# Patient Record
Sex: FEMALE | Race: WHITE | Employment: OTHER | URBAN - METROPOLITAN AREA
[De-identification: names, ages, dates, MRNs, and addresses within clinical notes are randomized per-mention and may not be internally consistent; named-entity substitution may affect disease eponyms.]

---

## 2017-07-12 ENCOUNTER — HOSPITAL ENCOUNTER (OUTPATIENT)
Dept: PHYSICAL THERAPY | Age: 62
Discharge: HOME | End: 2017-07-12
Payer: OTHER GOVERNMENT

## 2017-07-12 PROCEDURE — 97161 PT EVAL LOW COMPLEX 20 MIN: CPT

## 2017-07-12 PROCEDURE — 97530 THERAPEUTIC ACTIVITIES: CPT

## 2017-07-12 NOTE — PROGRESS NOTES
[facility] at [facility], [address] [place] [address]  Phone: [phone]   Fax: [phone]    Plan of Care/ Statement of Necessity for Physical Therapy Services     Patient name: Rani Cantrell Start of Care: 2017   Referral source: Wes Mccarty MD : 1955    Medical Diagnosis: Knee pain, right [M25.561]   Onset Date:2017    Treatment Diagnosis: david knee pain   Prior Hospitalization: see medical history Provider#: [id number]   Medications: Verified on Patient summary List    Comorbidities: DM, HTN, BMI   Prior Level of Function: walking 1 mile, no deficits with standing or walking prior to pain in 2017    The Plan of Care and following information is based on the information from the initial evaluation. Assessment/ key information: Pt is a 60 y.o. female presenting to therapy with david knee pain and diagnosis of david osteoarthritis per MD script. Pt reports pain began with insidious onset 2017 and gradual worsening of right knee pain leading to left knee pain 3 weeks ago. XRAY confirm right knee severe OA. Pt impairments include limited right knee ROM, decreased hip and glut strength, decrease quad contraction david, tenderness to palpation around joint line and pes anserine's. Pt functional limitations include pain/difficulty with getting into/out of tub, getting up and down from floor, picking up kids, standing, walking, sitting prolonged periods of time, step to pattern . Pt treatment diagnosis consistent with david knee OA. Pt would benefit from skilled PT to improve impairments listed above and return pt to prior level of function of less pain with work and ADLs.    Evaluation Complexity History MEDIUM  Complexity : 1-2 comorbidities / personal factors will impact the outcome/ POC ; Examination HIGH Complexity : 4+ Standardized tests and measures addressing body structure, function, activity limitation and / or participation in recreation ;Presentation LOW Complexity : Stable, uncomplicated  ;Clinical Decision Making MEDIUM Complexity : FOTO score of 26-74  Overall Complexity Rating: LOW   Problem List: pain affecting function, decrease ROM, decrease strength, impaired gait/ balance, decrease ADL/ functional abilitiies, decrease activity tolerance, decrease flexibility/ joint mobility and decrease transfer abilities   Treatment Plan may include any combination of the following: Therapeutic exercise, Therapeutic activities, Neuromuscular re-education, Physical agent/modality, Gait/balance training, Manual therapy, Aquatic therapy, Patient education, Self Care training and Functional mobility training  Patient / Family readiness to learn indicated by: asking questions, trying to perform skills and interest  Persons(s) to be included in education: patient (P)  Barriers to Learning/Limitations: None  Patient Goal (s): I want to be able to go back to my walking especially, and to be able to get on the floor and get back up ( duties). \"  Patient Self Reported Health Status: good  Rehabilitation Potential: good    Short Term Goals: STG- To be accomplished in 2 week(s):  1. Pt will be independent with HEP to encourage prophylaxis. Eval:dispensed  Current: NA     Long Term Goals: LTG- To be accomplished in 6 week(s):  1. Pt will report pain at worst does not exceed 4/10 to allow pt to return to walking >0.5 mi for fitness and health related goals. Eval:10/10  Current: NA     2.  Pt will report min to no TTP at pes anserine's [facility] to normalize ADLs and improve quality of life. Eval:mod TTP  Current: NA     3.  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 Eval:  Strength (1-5)        Left Right   Hip Flexion 4 4- pain      Extension 4- 4-      Abduction 4- 3+      Adduction 4- 4   Current: NA     4.   Pt FOTO score will increase by >10 points to show improvement in david knee pain function. Eval:37  Current: will address at visit 5       Frequency / Duration: Patient to be seen 2 times per week for 6 weeks. Patient/ Caregiver education and instruction: Diagnosis, prognosis, self care, activity modification and exercises   [x]  Plan of care has been reviewed with TRELL Neves, PT 7/12/2017 3:29 PM  _____________________________________________________________________  I certify that the above Therapy Services are being furnished while the patient is under my care. I agree with the treatment plan and certify that this therapy is necessary.     Physician's Signature:____________________  Date:__________Time:______    Please sign and return to [facility] at [address] [place] [address]  Phone: [phone]   Fax: [phone]

## 2017-07-12 NOTE — PROGRESS NOTES
PT DAILY TREATMENT NOTE/KNEE EVAL 3-16    Patient Name: Greg Solis  Date:2017  : 1955  [x]  Patient  Verified  Payor:  / Plan: [facility]  RETIREES AND DEPENDENTS / Product Type: Christianne Leventhal /    In time:314  Out time:345  Total Treatment Time (min): 31  Visit #: 1 of 12    Treatment Area: Knee pain, right [M25.561]    SUBJECTIVE  Pain Level (0-10 scale): 8  []constant []intermittent []improving []worsening []no change since onset    Any medication changes, allergies to medications, adverse drug reactions, diagnosis change, or new procedure performed?: [x] No    [] Yes (see summary sheet for update)  Subjective functional status/changes:       Pain:  _10_/10 max       _4_/10 min     __8_/10 now    Location: right inferior patellar and anterior, medial compartment of knee and anterior shin on occasion     [X] Sharp    [ ] Ltanya Push [ ] Diania Deepali [ Veatrice Reasons [ ] Bk[employer]Christianity [ ] Thomas Spearing [ ] Other:        [X]  [de-identified] [ ] Intermittent        Weakness/Popping/clicking/giving way- 2 falls in last 6 mo due to right LE giving way  Pain at night- waking several nights due to pain    Social/Recreation       [address], gardening, walking  (1 mile- prior to onset of pain)               Aggravating factors- getting into/out of tub, getting up and down from floor, picking up kids, standing, walking, sitting prolonged periods of time, step to pattern   Relieving factors    PLOF: walking 1 mile, no deficits with standing or walking prior to pain in 2017  Limitations to PLOF: pain  Mechanism of Injury: Pt reports 2017 right knee pain began to ache and then gradual worsening of pain with clicking and giving way. Pt went to MD and increased medication with little improvement. Pt had XRAY with reports of severe OA, trial PT and if no success perhaps discuss TKA.   Pt reports left knee pain began to worsen 3 weeks ago, but reports not anything like right LE and has \"always had left LE pain\" (fell and broke left foot). Pt reports PT has been successfully in left LE pain in the past.  Current symptoms/Complaints: pain  Previous Treatment/Compliance: PT with improvements for left LE  PMHx/Surgical Hx: denies  Work Hx: day care for   Living Situation: lives with spouse, bedroom on first floor, shower on second floor- step to pattern  Pt Goals: \"I want to be able to go back to my walking especially, and to be able to get on the floor and get back up ( duties). \"  Barriers: []pain []financial []time []transportation []other  Motivation: good  Substance use: []Alcohol []Tobacco []other:   FABQ Score: []low []elevate  Cognition: A & O x 4    Other:    OBJECTIVE/EXAMINATION    21 min []Eval                  []Re-Eval       10 min Therapeutic Activity:  []  See flow sheet : Discussed and reviewed diagnosis, prognosis, POC, HEP   Rationale: increase ROM, increase strength, improve coordination, improve balance and increase proprioception  to improve the patients ability to perform ADL's with reduced pain levels.          With   [] TE   [] TA   [] neuro   [] other: Patient Education: [x] Review HEP    [] Progressed/Changed HEP based on:   [] positioning   [] body mechanics   [] transfers   [] heat/ice application    [] other:      Physical Therapy Evaluation - Knee    Posture: [] Varus    [] Valgus    [] Recurvatum        [] Tibial Torsion    [] Foot Supination    [] Foot Pronation    Describe:    Gait:  [] Normal    [] Abnormal    [] Antalgic    [] NWB    Device:    Describe:    ROM / Strength  [] Unable to assess                  AROM                      PROM                   Strength (1-5)    Left Right Left Right Left Right   Hip Flexion     4 4- pain    Extension     4- 4-    Abduction     4- 3+    Adduction     4- 4   Knee Flexion 135 116   5 4    Extension Hyper 2 0   5 4   Ankle Plantarflexion     5 5    Dorsiflexion     5 5       Flexibility: [] Unable to assess at this time  Hamstrings:    (L) Tightness= [] WNL   [x] Min   [] Mod   [] Severe    (R) Tightness= [] WNL   [x] Min   [] Mod   [] Severe  Quadriceps:    (L) Tightness= [] WNL   [] Min   [] Mod   [] Severe    (R) Tightness= [] WNL   [] Min   [] Mod   [] Severe  Gastroc:      (L) Tightness= [] WNL   [] Min   [] Mod   [] Severe    (R) Tightness= [] WNL   [] Min   [] Mod   [] Severe  Other:    Palpation:   Neg/Pos  Neg/Pos  Neg/Pos   Joint Line pos Quad tendon  Patellar ligament    Patella  Fibular head  Pes Anserinus Pos right and left   Tibial tubercle  Hamstring tendons Pos- right Infrapatellar fat pad      Optional Tests:  Patellar Positioning (Static)   []L []R Normal []L []R Lateral   []L []R Edyth Model      []L []R Medial   []L []R Baja    Patellar Tracking   []L []R Glide (Lat)   []L []R Tilt (Lat)     []L []R Glide (Med)  []L []R Tilt (Med)      []L []R Tile (Inf)     Patellar Mobility   []L []R Hypermobile []L []R Hypomobile         Girth Measurements:     Cm at  Cm above joint line   Cm at   Cm below joint line  Cm at joint line   Left        Right           Lachmans  [] Neg    [] Pos Posterior Drawer [] Neg    [] Pos  Pivot Shift  [] Neg    [] Pos Posterior Sag  [] Neg    [] Pos  KIRSTEN   [] Neg    [] Pos Brennan's Test [] Neg    [] Pos  ALRI   [] Neg    [] Pos Squat   [] Neg    [] Pos  Valgus@ 0 Degrees [] Neg    [] Pos Keyla-Ian [] Neg    [] Pos  Valgus@ 30 Degrees [] Neg    [] Pos Patellar Apprehension [] Neg    [x] Pos  Varus@ 0 Degrees [] Neg    [] Pos Benjamin's Compression [] Neg    [] Pos  Varus@ 30 Degrees [] Neg    [] Pos Ely's Test  [] Neg    [] Pos  Apley's Compression [] Neg    [] Pos Clayton's Test  [] Neg    [] Pos  Apley's Distraction [] Neg    [] Pos Stroke Test  [] Neg    [] Pos   Anterior Drawer [] Neg    [] Pos Fluctuation Test [] Neg    [] Pos  Other:                  [] Neg    [] Pos                 Other tests/comments: most tender at joint line and pes anserine's   Fair quad set david  Minor limitations in patellar mobility in right LE       Pain Level (0-10 scale) post treatment: 6    ASSESSMENT/Changes in Function: Pt is a 60 y.o. female presenting to therapy with david knee pain and diagnosis of david osteoarthritis per MD script. Pt reports pain began with insidious onset 05/2017 and gradual worsening of right knee pain leading to left knee pain 3 weeks ago. XRAY confirm right knee severe OA. Pt impairments include limited right knee ROM, decreased hip and glut strength, decrease quad contraction david, tenderness to palpation around joint line and pes anserine's. Pt functional limitations include pain/difficulty with getting into/out of tub, getting up and down from floor, picking up kids, standing, walking, sitting prolonged periods of time, step to pattern . Pt treatment diagnosis consistent with david knee OA. Pt would benefit from skilled PT to improve impairments listed above and return pt to prior level of function of less pain with work and ADLs. Patient will continue to benefit from skilled PT services to modify and progress therapeutic interventions, address functional mobility deficits, address ROM deficits, address strength deficits, analyze and address soft tissue restrictions, analyze and cue movement patterns, analyze and modify body mechanics/ergonomics, assess and modify postural abnormalities and instruct in home and community integration to attain remaining goals. []  See Plan of Care  []  See progress note/recertification  []  See Discharge Summary         Progress towards goals / Updated goals:  Short Term Goals: STG- To be accomplished in 2 week(s):  1. Pt will be independent with HEP to encourage prophylaxis. Eval:dispensed  Current: NA    Long Term Goals: LTG- To be accomplished in 6 week(s):  1. Pt will report pain at worst does not exceed 4/10 to allow pt to return to walking >0.5 mi for fitness and health related goals. Eval:10/10  Current: NA    2.   Pt will report min to no TTP at pes anserine's [facility] to normalize ADLs and improve quality of life. Eval:mod TTP  Current: NA    3. 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 Eval:  Strength (1-5)      Left Right   Hip Flexion 4 4- pain     Extension 4- 4-     Abduction 4- 3+     Adduction 4- 4   Current: NA    4. Pt FOTO score will increase by >10 points to show improvement in david knee pain function.   Eval:37  Current: will address at visit 5    PLAN  [x]  Upgrade activities as tolerated     []  Continue plan of care  []  Update interventions per flow sheet       []  Discharge due to:_  []  Other:_      [place], PT 7/12/2017  3:16 PM

## 2017-07-14 ENCOUNTER — HOSPITAL ENCOUNTER (OUTPATIENT)
Dept: PHYSICAL THERAPY | Age: 62
Discharge: HOME | End: 2017-07-14
Payer: OTHER GOVERNMENT

## 2017-07-14 PROCEDURE — 97110 THERAPEUTIC EXERCISES: CPT

## 2017-07-14 PROCEDURE — 97112 NEUROMUSCULAR REEDUCATION: CPT

## 2017-07-14 PROCEDURE — 97016 VASOPNEUMATIC DEVICE THERAPY: CPT

## 2017-07-14 NOTE — PROGRESS NOTES
PT DAILY TREATMENT NOTE     Patient Name: Alber Nichols  Date:2017  : 1955  [x]  Patient  Verified  Payor:  / Plan: [facility]  RETIREES AND DEPENDENTS / Product Type: Clemon Hope /    In time:1107  Out time:1150  Total Treatment Time (min): 43  Visit #: 2 of 12    Treatment Area: Knee pain, right [M25.561]    SUBJECTIVE  Pain Level (0-10 scale): 8  Any medication changes, allergies to medications, adverse drug reactions, diagnosis change, or new procedure performed?: [x] No    [] Yes (see summary sheet for update)  Subjective functional status/changes:   [] No changes reported  \"Those exercises you gave me were hard. \"    OBJECTIVE    Modality rationale: decrease inflammation and decrease pain to improve the patients ability to perform ADL's with reduced pain levels.     Min Type Additional Details    [] Estim:  []Unatt       []IFC  []Premod                        []Other:  []w/ice   []w/heat  Position:  Location:    [] Estim: []Att    []TENS instruct  []NMES                    []Other:  []w/US   []w/ice   []w/heat  Position:  Location:    []  Traction: [] Cervical       []Lumbar                       [] Prone          []Supine                       []Intermittent   []Continuous Lbs:  [] before manual  [] after manual    []  Ultrasound: []Continuous   [] Pulsed                           []1MHz   []3MHz W/cm2:  Location:    []  Iontophoresis with dexamethasone         Location: [] Take home patch   [] In clinic    []  Ice     []  heat  []  Ice massage  []  Laser   []  Anodyne Position:  Location:    []  Laser with stim  []  Other:  Position:  Location:   10 [x]  Vasopneumatic Device Pressure:       [] lo [] med [] hi   Temperature: [] lo [] med [] hi   [x] Skin assessment post-treatment:  [x]intact []redness- no adverse reaction    []redness - adverse reaction:       22 min Therapeutic Exercise:  [x] See flow sheet :   Rationale: increase ROM, increase strength, improve coordination, improve balance and increase proprioception to improve the patients ability to perform ADL's with reduced pain levels. 11 min Neuromuscular Re-education:  [x]  See flow sheet :   Rationale: increase ROM, increase strength, improve coordination, improve balance and increase proprioception  to improve the patients ability to perform ADL's with reduced pain levels. With   [] TE   [] TA   [] neuro   [] other: Patient Education: [x] Review HEP    [] Progressed/Changed HEP based on:   [] positioning   [] body mechanics   [] transfers   [] heat/ice application    [] other:      Other Objective/Functional Measures:  3-5 errors with SLS    Pain Level (0-10 scale) post treatment: 5    ASSESSMENT/Changes in Function: Pt challenged with narrow base of support (NBOS) on foam balance    Patient will continue to benefit from skilled PT services to modify and progress therapeutic interventions, address functional mobility deficits, address ROM deficits, address strength deficits, analyze and address soft tissue restrictions, analyze and cue movement patterns, analyze and modify body mechanics/ergonomics, assess and modify postural abnormalities and instruct in home and community integration to attain remaining goals. []  See Plan of Care  []  See progress note/recertification  []  See Discharge Summary         Progress towards goals / Updated goals:  Short Term Goals: STG- To be accomplished in 2 week(s):  1.  Pt will be independent with HEP to encourage prophylaxis. Eval:dispensed  Current: reports compliance      Long Term Goals: LTG- To be accomplished in 6 week(s):  1.  Pt will report pain at worst does not exceed 4/10 to allow pt to return to walking >0.5 mi for fitness and health related goals. Eval:10/10  Current: NA      2.  Pt will report min to no TTP at [facility]'s [facility] to normalize ADLs and improve quality of life.   Eval:mod TTP  Current: NA      3.  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 Eval:  Strength (1-5)        Left Right   Hip Flexion 4 4- pain      Extension 4- 4-      Abduction 4- 3+      Adduction 4- 4   Current: NA      4.  Pt FOTO score will increase by >10 points to show improvement in david knee pain function.   Eval:37  Current: will address at visit 5      PLAN  [x]  Upgrade activities as tolerated     []  Continue plan of care  []  Update interventions per flow sheet       []  Discharge due to:_  []  Other:_      Lexi Chin PT 7/14/2017  11:12 AM    Future Appointments  Date Time Provider Yanira Raygoza   7/24/2017 11:00 AM Lexi Chin PT [facility]   7/26/2017 11:00 AM Lanette Yuen PT [facility]   7/31/2017 11:00 AM Lanette Yuen PT [facility]   8/2/2017 11:00 AM Lexi Chin PT [facility]HPTRO[facility]   8/7/2017 11:30 AM Lexi Chin PT [facility]PGISSELLE [facility]   8/9/2017 11:30 AM Lexi Chin PT JAETRO[facility]   3/22/2018 10:15 AM Adrien Banuelos MD [address] [place] [address]

## 2017-07-21 ENCOUNTER — HOSPITAL ENCOUNTER (OUTPATIENT)
Dept: PHYSICAL THERAPY | Age: 62
Discharge: HOME | End: 2017-07-21
Payer: OTHER GOVERNMENT

## 2017-07-21 PROCEDURE — 97110 THERAPEUTIC EXERCISES: CPT

## 2017-07-21 PROCEDURE — 97112 NEUROMUSCULAR REEDUCATION: CPT

## 2017-07-21 PROCEDURE — 97016 VASOPNEUMATIC DEVICE THERAPY: CPT

## 2017-07-21 NOTE — PROGRESS NOTES
PT DAILY TREATMENT NOTE 12    Patient Name: Nathaly Ayoub  Date:2017  : 1955  [x]  Patient  Verified  Payor:  / Plan: [facility]  RETIREES AND DEPENDENTS / Product Type: Lavada Gave /    In time:900  Out time:947  Total Treatment Time (min): 52  Visit #: 3 of 12    Treatment Area: Knee pain, right [M25.561]    SUBJECTIVE  Pain Level (0-10 scale): 8  Any medication changes, allergies to medications, adverse drug reactions, diagnosis change, or new procedure performed?: [x] No    [] Yes (see summary sheet for update)  Subjective functional status/changes:   [] No changes reported  \"My knees hurt. \"    OBJECTIVE    Modality rationale: decrease inflammation and decrease pain to improve the patients ability to perform ADL's with reduced pain levels.     Min Type Additional Details    [] Estim:  []Unatt       []IFC  []Premod                        []Other:  []w/ice   []w/heat  Position:  Location:    [] Estim: []Att    []TENS instruct  []NMES                    []Other:  []w/US   []w/ice   []w/heat  Position:  Location:    []  Traction: [] Cervical       []Lumbar                       [] Prone          []Supine                       []Intermittent   []Continuous Lbs:  [] before manual  [] after manual    []  Ultrasound: []Continuous   [] Pulsed                           []1MHz   []3MHz W/cm2:  Location:    []  Iontophoresis with dexamethasone         Location: [] Take home patch   [] In clinic    []  Ice     []  heat  []  Ice massage  []  Laser   []  Anodyne Position:  Location:    []  Laser with stim  []  Other:  Position:  Location:   10 [x]  Vasopneumatic Device Pressure:       [] lo [] med [] hi   Temperature: [] lo [] med [] hi   [x] Skin assessment post-treatment:  [x]intact []redness- no adverse reaction    []redness - adverse reaction:       24 min Therapeutic Exercise:  [x] See flow sheet :   Rationale: increase ROM, increase strength, improve coordination, improve balance and increase proprioception to improve the patients ability to perform ADL's with reduced pain levels.         10 min Neuromuscular Re-education:  [x]  See flow sheet :   Rationale: increase ROM, increase strength, improve coordination, improve balance and increase proprioception  to improve the patients ability to perform ADL's with reduced pain levels. With   [] TE   [] TA   [] neuro   [] other: Patient Education: [x] Review HEP    [] Progressed/Changed HEP based on:   [] positioning   [] body mechanics   [] transfers   [] heat/ice application    [] other:      Other Objective/Functional Measures: challenged with balance     Pain Level (0-10 scale) post treatment: 0    ASSESSMENT/Changes in Function: Pt reported MD visit this week, to be referred to ortho. Pt reported appropriate fatigue with therEx. Patient will continue to benefit from skilled PT services to modify and progress therapeutic interventions, address functional mobility deficits, address ROM deficits, address strength deficits, analyze and address soft tissue restrictions, analyze and cue movement patterns, analyze and modify body mechanics/ergonomics, assess and modify postural abnormalities and instruct in home and community integration to attain remaining goals. []  See Plan of Care  []  See progress note/recertification  []  See Discharge Summary         Progress towards goals / Updated goals:  Short Term Goals: STG- To be accomplished in 2 week(s):  1.  Pt will be independent with HEP to encourage prophylaxis. Eval:dispensed  Current: reports compliance      Long Term Goals: LTG- To be accomplished in 6 week(s):  1.  Pt will report pain at worst does not exceed 4/10 to allow pt to return to walking >0.5 mi for fitness and health related goals. Eval:10/10  Current: NA      2.  Pt will report min to no TTP at [facility] anserine's [facility] to normalize ADLs and improve quality of life.   Eval:mod TTP  Current: NA      3.  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 Eval:  Strength (1-5)        Left Right   Hip Flexion 4 4- pain      Extension 4- 4-      Abduction 4- 3+      Adduction 4- 4   Current: NA      4.  Pt FOTO score will increase by >10 points to show improvement in david knee pain function.   Eval:37  Current: will address at visit 5      PLAN  [x]  Upgrade activities as tolerated     []  Continue plan of care  []  Update interventions per flow sheet       []  Discharge due to:_  []  Other:_      Raúl Winter PT 7/21/2017  9:24 AM    Future Appointments  Date Time Provider Yanira Raygoza   7/24/2017 11:00 AM [id number] [facility]   7/26/2017 11:00 AM VIVIENNE Gonzalez [facility]   7/31/2017 11:00 AM VIVIENNE Gonzalez [facility]   8/2/2017 11:00 AM VIVIENNE Delaney [facility]   8/7/2017 11:30 AM VIVIENNE Delaney [facility]   8/9/2017 11:30 AM VIVIENNE Delaney [facility]   3/22/2018 10:15 AM [facility]

## 2017-07-24 ENCOUNTER — APPOINTMENT (OUTPATIENT)
Dept: PHYSICAL THERAPY | Age: 62
End: 2017-07-24
Payer: OTHER GOVERNMENT

## 2017-07-31 ENCOUNTER — HOSPITAL ENCOUNTER (OUTPATIENT)
Dept: PHYSICAL THERAPY | Age: 62
Discharge: HOME | End: 2017-07-31
Payer: OTHER GOVERNMENT

## 2017-07-31 PROCEDURE — 97112 NEUROMUSCULAR REEDUCATION: CPT

## 2017-07-31 PROCEDURE — 97016 VASOPNEUMATIC DEVICE THERAPY: CPT

## 2017-07-31 PROCEDURE — 97110 THERAPEUTIC EXERCISES: CPT

## 2017-07-31 NOTE — PROGRESS NOTES
PT DAILY TREATMENT NOTE     Patient Name: Tessa Hernandez  Date:2017  : 1955  [x]  Patient  Verified  Payor:  / Plan: [facility]  RETIREES AND DEPENDENTS / Product Type: Ashley [place] /    In time:1100  Out time:1150  Total Treatment Time (min): 50  Visit #: 4 of 12    Treatment Area: Knee pain, right [M25.561]    SUBJECTIVE  Pain Level (0-10 scale): 4  Any medication changes, allergies to medications, adverse drug reactions, diagnosis change, or new procedure performed?: [x] No    [] Yes (see summary sheet for update)  Subjective functional status/changes:   [] No changes reported  \"It hurt after riding so much yesterday. \"    OBJECTIVE    Modality rationale: decrease inflammation and decrease pain to improve the patients ability to perform ADL's with reduced pain levels.     Min Type Additional Details    [] Estim:  []Unatt       []IFC  []Premod                        []Other:  []w/ice   []w/heat  Position:  Location:    [] Estim: []Att    []TENS instruct  []NMES                    []Other:  []w/US   []w/ice   []w/heat  Position:  Location:    []  Traction: [] Cervical       []Lumbar                       [] Prone          []Supine                       []Intermittent   []Continuous Lbs:  [] before manual  [] after manual    []  Ultrasound: []Continuous   [] Pulsed                           []1MHz   []3MHz W/cm2:  Location:    []  Iontophoresis with dexamethasone         Location: [] Take home patch   [] In clinic    []  Ice     []  heat  []  Ice massage  []  Laser   []  Anodyne Position:  Location:    []  Laser with stim  []  Other:  Position:  Location:   10 [x]  Vasopneumatic Device Pressure:       [] lo [] med [] hi   Temperature: [] lo [] med [] hi   [x] Skin assessment post-treatment:  [x]intact []redness- no adverse reaction    []redness - adverse reaction:     30 min Therapeutic Exercise:  [x] See flow sheet :   Rationale: increase ROM, increase strength, improve coordination, improve balance and increase proprioception to improve the patients ability to perform ADL's with reduced pain levels. 10 min Neuromuscular Re-education:  [x]  See flow sheet :   Rationale: increase ROM, increase strength, improve coordination, improve balance and increase proprioception  to improve the patients ability to perform ADL's with reduced pain levels. With   [] TE   [] TA   [] neuro   [] other: Patient Education: [x] Review HEP    [] Progressed/Changed HEP based on:   [] positioning   [] body mechanics   [] transfers   [] heat/ice application    [] other:      Other Objective/Functional Measures:      Pain Level (0-10 scale) post treatment: 0    ASSESSMENT/Changes in Function: Pt tolerated session very will addition of several new exercises. Patient will continue to benefit from skilled PT services to modify and progress therapeutic interventions, address functional mobility deficits, address ROM deficits, address strength deficits, analyze and address soft tissue restrictions, analyze and cue movement patterns, analyze and modify body mechanics/ergonomics, assess and modify postural abnormalities and instruct in home and community integration to attain remaining goals. []  See Plan of Care  []  See progress note/recertification  []  See Discharge Summary         Progress towards goals / Updated goals:  Short Term Goals: STG- To be accomplished in 2 week(s):  1.  Pt will be independent with HEP to encourage prophylaxis. Eval:dispensed  Current: reports compliance      Long Term Goals: LTG- To be accomplished in 6 week(s):  1.  Pt will report pain at worst does not exceed 4/10 to allow pt to return to walking >0.5 mi for fitness and health related goals. Eval:10/10  Current: improving pain levels at entry and exit-- progressing      2.  Pt will report min to no TTP at [facility] to normalize ADLs and improve quality of life.   Eval:mod TTP  Current: NA      3.  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 Eval:  Strength (1-5)        Left Right   Hip Flexion 4 4- pain      Extension 4- 4-      Abduction 4- 3+      Adduction 4- 4   Current: NA      4.  Pt FOTO score will increase by >10 points to show improvement in david knee pain function.   Eval:37  Current: will address at visit 5       PLAN  []  Upgrade activities as tolerated     [x]  Continue plan of care  []  Update interventions per flow sheet       []  Discharge due to:_  []  Other:_      Shellie Chamberlain PT 7/31/2017  10:59 AM    Future Appointments  Date Time Provider Yanira Raygoza   7/31/2017 11:00 AM VIVIENNE Aguilar [facility]   8/2/2017 11:00 AM Patsy Aguilar THE [facility]   8/7/2017 11:30 AM Patsy Aguilar THE [facility]   8/9/2017 11:30 AM VIVIENNE Aguilar [facility]   3/22/2018 10:15 AM Gabi Garcia MD [address] [place] [address]

## 2017-08-02 ENCOUNTER — HOSPITAL ENCOUNTER (OUTPATIENT)
Dept: PHYSICAL THERAPY | Age: 62
Discharge: HOME | End: 2017-08-02
Payer: OTHER GOVERNMENT

## 2017-08-02 PROCEDURE — 97530 THERAPEUTIC ACTIVITIES: CPT

## 2017-08-02 PROCEDURE — 97016 VASOPNEUMATIC DEVICE THERAPY: CPT

## 2017-08-02 PROCEDURE — 97110 THERAPEUTIC EXERCISES: CPT

## 2017-08-02 NOTE — PROGRESS NOTES
PT DAILY TREATMENT NOTE 12    Patient Name: Maite Care  Date:2017  : 1955  [x]  Patient  Verified  Payor:  / Plan: [facility]  RETIREES AND DEPENDENTS / Product Type: Mecca Bounds /    In time:1102  Out time:1158  Total Treatment Time (min): 56  Visit #: 5 of 12    Treatment Area: Knee pain, right [M25.561]    SUBJECTIVE  Pain Level (0-10 scale): 5  Any medication changes, allergies to medications, adverse drug reactions, diagnosis change, or new procedure performed?: [x] No    [] Yes (see summary sheet for update)  Subjective functional status/changes:   [] No changes reported  \"It's been hurting after that car ride this weekend. \"    OBJECTIVE    Modality rationale: decrease inflammation and decrease pain to improve the patients ability to perform ADL's with reduced pain levels.     Min Type Additional Details    [] Estim:  []Unatt       []IFC  []Premod                        []Other:  []w/ice   []w/heat  Position:  Location:    [] Estim: []Att    []TENS instruct  []NMES                    []Other:  []w/US   []w/ice   []w/heat  Position:  Location:    []  Traction: [] Cervical       []Lumbar                       [] Prone          []Supine                       []Intermittent   []Continuous Lbs:  [] before manual  [] after manual    []  Ultrasound: []Continuous   [] Pulsed                           []1MHz   []3MHz W/cm2:  Location:    []  Iontophoresis with dexamethasone         Location: [] Take home patch   [] In clinic    []  Ice     []  heat  []  Ice massage  []  Laser   []  Anodyne Position:  Location:    []  Laser with stim  []  Other:  Position:  Location:   10 [x]  Vasopneumatic Device Pressure:       [] lo [] med [] hi   Temperature: [] lo [] med [] hi   [x] Skin assessment post-treatment:  [x]intact []redness- no adverse reaction    []redness - adverse reaction:     34 min Therapeutic Exercise:  [x] See flow sheet :   Rationale: increase ROM, increase strength, improve coordination, improve balance and increase proprioception to improve the patients ability to perform ADL's with reduced pain levels. 12 min Therapeutic Activity:  [x]  See flow sheet :   Rationale: increase ROM, increase strength, improve coordination, improve balance and increase proprioception  to improve the patients ability to perform ADL's with reduced pain levels. With   [] TE   [] TA   [] neuro   [] other: Patient Education: [x] Review HEP    [] Progressed/Changed HEP based on:   [] positioning   [] body mechanics   [] transfers   [] heat/ice application    [] other:      Other Objective/Functional Measures:  FOTO 48    Pain Level (0-10 scale) post treatment: 0    ASSESSMENT/Changes in Function: Consider progressing to GTB next visit for several exercises. Good tolerance and appropriate fatigue to this visit. Patient will continue to benefit from skilled PT services to modify and progress therapeutic interventions, address functional mobility deficits, address ROM deficits, address strength deficits, analyze and address soft tissue restrictions, analyze and cue movement patterns, analyze and modify body mechanics/ergonomics, assess and modify postural abnormalities and instruct in home and community integration to attain remaining goals. []  See Plan of Care  []  See progress note/recertification  []  See Discharge Summary         Progress towards goals / Updated goals:  Short Term Goals: STG- To be accomplished in 2 week(s):  1.  Pt will be independent with HEP to encourage prophylaxis. Eval:dispensed  Current: reports compliance      Long Term Goals: LTG- To be accomplished in 6 week(s):  1.  Pt will report pain at worst does not exceed 4/10 to allow pt to return to walking >0.5 mi for fitness and health related goals.   Eval:10/10  Current: improving pain levels at entry and exit-- progressing      2.  Pt will report min to no TTP at [facility] to normalize ADLs and improve quality of life.  Eval:mod TTP  Current: NA      3.  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 Eval:  Strength (1-5)        Left Right   Hip Flexion 4 4- pain      Extension 4- 4-      Abduction 4- 3+      Adduction 4- 4   Current: NA      4.  Pt FOTO score will increase by >10 points to show improvement in david knee pain function.   Eval:37  Current: 48-- MET      PLAN  [x]  Upgrade activities as tolerated     [x]  Continue plan of care  []  Update interventions per flow sheet       []  Discharge due to:_  []  Other:_      Lexi Chin, PT 8/2/2017  11:15 AM    Future Appointments  Date Time Provider Yanira Raygoza   8/7/2017 11:30 AM Patsy Alan THE [facility]   8/9/2017 11:30 AM Patsy Alan [facility]   3/22/2018 10:15 AM Adrien Banuelos MD [address] [place] [address]

## 2017-08-07 ENCOUNTER — HOSPITAL ENCOUNTER (OUTPATIENT)
Dept: PHYSICAL THERAPY | Age: 62
Discharge: HOME | End: 2017-08-07
Payer: OTHER GOVERNMENT

## 2017-08-07 PROCEDURE — 97110 THERAPEUTIC EXERCISES: CPT

## 2017-08-07 PROCEDURE — 97016 VASOPNEUMATIC DEVICE THERAPY: CPT

## 2017-08-07 PROCEDURE — 97112 NEUROMUSCULAR REEDUCATION: CPT

## 2017-08-07 NOTE — PROGRESS NOTES
PT DAILY TREATMENT NOTE     Patient Name: Alber Nichols  Date:2017  : 1955  [x]  Patient  Verified  Payor:  / Plan: [facility]  RETIREES AND DEPENDENTS / Product Type:  /    In time:1130  Out time:1218  Total Treatment Time (min): 48  Visit #: 6 of 12    Treatment Area: Knee pain, right [M25.561]    SUBJECTIVE  Pain Level (0-10 scale): 6  Any medication changes, allergies to medications, adverse drug reactions, diagnosis change, or new procedure performed?: [x] No    [] Yes (see summary sheet for update)  Subjective functional status/changes:   [] No changes reported  \"I see the ortho on the . \"    OBJECTIVE    Modality rationale: decrease inflammation and decrease pain to improve the patients ability to perform ADL's with reduced pain levels.     Min Type Additional Details    [] Estim:  []Unatt       []IFC  []Premod                        []Other:  []w/ice   []w/heat  Position:  Location:    [] Estim: []Att    []TENS instruct  []NMES                    []Other:  []w/US   []w/ice   []w/heat  Position:  Location:    []  Traction: [] Cervical       []Lumbar                       [] Prone          []Supine                       []Intermittent   []Continuous Lbs:  [] before manual  [] after manual    []  Ultrasound: []Continuous   [] Pulsed                           []1MHz   []3MHz W/cm2:  Location:    []  Iontophoresis with dexamethasone         Location: [] Take home patch   [] In clinic    []  Ice     []  heat  []  Ice massage  []  Laser   []  Anodyne Position:  Location:    []  Laser with stim  []  Other:  Position:  Location:   10 [x]  Vasopneumatic Device Pressure:       [] lo [] med [] hi   Temperature: [] lo [] med [] hi   [x] Skin assessment post-treatment:  [x]intact []redness- no adverse reaction    []redness - adverse reaction:     25 min Therapeutic Exercise:  [x] See flow sheet :   Rationale: increase ROM, increase strength, improve coordination, improve balance and increase proprioception to improve the patients ability to perform ADL's with reduced pain levels. 13 min Neuromuscular Re-education:  [x]  See flow sheet :   Rationale: increase ROM, increase strength, improve coordination, improve balance and increase proprioception  to improve the patients ability to perform ADL's with reduced pain levels. With   [] TE   [] TA   [] neuro   [] other: Patient Education: [x] Review HEP    [] Progressed/Changed HEP based on:   [] positioning   [] body mechanics   [] transfers   [] heat/ice application    [] other:      Other Objective/Functional Measures:      Pain Level (0-10 scale) post treatment: 6    ASSESSMENT/Changes in Function: Increased SLS time with good tolerance. Increased resistance of several exercises with good tolerance. Patient will continue to benefit from skilled PT services to modify and progress therapeutic interventions, address functional mobility deficits, address ROM deficits, address strength deficits, analyze and address soft tissue restrictions, analyze and cue movement patterns, analyze and modify body mechanics/ergonomics, assess and modify postural abnormalities and instruct in home and community integration to attain remaining goals. []  See Plan of Care  []  See progress note/recertification  []  See Discharge Summary         Progress towards goals / Updated goals:  Short Term Goals: STG- To be accomplished in 2 week(s):  1.  Pt will be independent with HEP to encourage prophylaxis. Eval:dispensed  Current: reports compliance      Long Term Goals: LTG- To be accomplished in 6 week(s):  1.  Pt will report pain at worst does not exceed 4/10 to allow pt to return to walking >0.5 mi for fitness and health related goals. Eval:10/10  Current: improving pain levels at entry and exit-- progressing      2.  Pt will report min to no TTP at [facility] to normalize ADLs and improve quality of life.   Eval:mod TTP  Current: NA      3.  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 Eval:  Strength (1-5)        Left Right   Hip Flexion 4 4- pain      Extension 4- 4-      Abduction 4- 3+      Adduction 4- 4   Current: continues to be very fatigued with clams-- progressing      4.  Pt FOTO score will increase by >10 points to show improvement in david knee pain function.   Eval:37  Current: 48-- MET      PLAN  [x]  Upgrade activities as tolerated     []  Continue plan of care  []  Update interventions per flow sheet       []  Discharge due to:_  []  Other:_      Marla Medina PT 8/7/2017  11:51 AM    Future Appointments  Date Time Provider Yanira Raygoza   8/9/2017 11:30 AM Marla Medina PT [facility]   3/22/2018 10:15 AM Edy Mcmillan MD [address]

## 2017-08-09 ENCOUNTER — HOSPITAL ENCOUNTER (OUTPATIENT)
Dept: PHYSICAL THERAPY | Age: 62
Discharge: HOME | End: 2017-08-09
Payer: OTHER GOVERNMENT

## 2017-08-09 PROCEDURE — 97016 VASOPNEUMATIC DEVICE THERAPY: CPT

## 2017-08-09 PROCEDURE — 97112 NEUROMUSCULAR REEDUCATION: CPT

## 2017-08-09 PROCEDURE — 97110 THERAPEUTIC EXERCISES: CPT

## 2017-08-09 NOTE — PROGRESS NOTES
[facility] at [address] [place] [address]  Phone: [phone]   Fax: [phone]    Progress Note  Patient name: Beverlyn Schirmer Start of Care: 17   Referral source: Dori Andujar MD : 1955   Medical/Treatment Diagnosis: Knee pain, right [M25.561] Onset Date:2017     Prior Hospitalization: see medical history Provider#: [id number]   Medications: Verified on Patient Summary List    Comorbidities: DM, HTN, BMI   Prior Level of Function: walking 1 mile, no deficits with standing or walking prior to pain in 2017    Visits from Start of Care: 7    Missed Visits: 0    Established Goals:            Short Term Goals: STG- To be accomplished in 2 week(s):  1.  Pt will be independent with HEP to encourage prophylaxis. Eval:dispensed  Current: reports compliance-- MET      Long Term Goals: LTG- To be accomplished in 6 week(s):  1.  Pt will report pain at worst does not exceed 4/10 to allow pt to return to walking >0.5 mi for fitness and health related goals. Eval:10/10  Current:8/10 at worst-- progressing      2.  Pt will report min to no TTP at [facility] david to normalize ADLs and improve quality of life. Eval:mod TTP  Current: mod remains-- not met      3.  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 Eval:  Strength (1-5)        Left Right   Hip Flexion 4 4- pain      Extension 4- 4-      Abduction 4- 3+      Adduction 4- 4   Current: flex 4+/5, abd right 4-, abd left 4/5, add left and right 4, 4/5 hip ext david--progressing toward goal      4.  Pt FOTO score will increase by >10 points to show improvement in david knee pain function. Eval:37  Current: 48-- MET    Key Functional Changes: Pt has attended 7 PT visits since IE with subjective and objective progress toward goals. Strength gains noted, but remains with moderate to high pain levels.   Pt reports improvements in ability to walk for fitness goals. Due to progress, but remaining deficits would continue to benefit from skilled PT. Updated Goals: to be achieved in 4 weeks:   Cont with unmet goals  ASSESSMENT/RECOMMENDATIONS:  [x]Continue therapy per initial plan/protocol at a frequency of  2 x per week for 4 weeks  []Continue therapy with the following recommended changes:_____________________      _____________________________________________________________________  []Discontinue therapy progressing towards or have reached established goals  []Discontinue therapy due to lack of appreciable progress towards goals  []Discontinue therapy due to lack of attendance or compliance  []Await Physician's recommendations/decisions regarding therapy  []Other:________________________________________________________________    Thank you for this referral.   Shellie Chamberlain, PT 8/9/2017 12:21 PM  NOTE TO PHYSICIAN:  PLEASE COMPLETE THE ORDERS BELOW AND   FAX TO [facility]: [phone]  If you are unable to process this request in 24 hours please contact our office:   [phone]  []  I have read the above report and request that my patient continue as recommended. []  I have read the above report and request that my patient continue therapy with the following changes/special instructions:________________________________________  []I have read the above report and request that my patient be discharged from therapy.     Physicians signature: ______________________________Date: ______Time:______

## 2017-08-09 NOTE — PROGRESS NOTES
PT DAILY TREATMENT NOTE     Patient Name: Rajan Orozco  Date:2017  : 1955  [x]  Patient  Verified  Payor:  / Plan: [facility]  RETIREES AND DEPENDENTS / Product Type: Tae Andres /    In time:1132  Out time:1220  Total Treatment Time (min): 48  Visit #: 7 of 12    Treatment Area: Knee pain, right [M25.561]    SUBJECTIVE  Pain Level (0-10 scale): 6  Any medication changes, allergies to medications, adverse drug reactions, diagnosis change, or new procedure performed?: [x] No    [] Yes (see summary sheet for update)  Subjective functional status/changes:   [] No changes reported  \"It is getting better. \"    OBJECTIVE    Modality rationale: decrease inflammation and decrease pain to improve the patients ability to perform ADL's with reduced pain levels.     Min Type Additional Details    [] Estim:  []Unatt       []IFC  []Premod                        []Other:  []w/ice   []w/heat  Position:  Location:    [] Estim: []Att    []TENS instruct  []NMES                    []Other:  []w/US   []w/ice   []w/heat  Position:  Location:    []  Traction: [] Cervical       []Lumbar                       [] Prone          []Supine                       []Intermittent   []Continuous Lbs:  [] before manual  [] after manual    []  Ultrasound: []Continuous   [] Pulsed                           []1MHz   []3MHz W/cm2:  Location:    []  Iontophoresis with dexamethasone         Location: [] Take home patch   [] In clinic    []  Ice     []  heat  []  Ice massage  []  Laser   []  Anodyne Position:  Location:    []  Laser with stim  []  Other:  Position:  Location:   10 [x]  Vasopneumatic Device Pressure:       [] lo [] med [] hi   Temperature: [] lo [] med [] hi   [x] Skin assessment post-treatment:  [x]intact []redness- no adverse reaction    []redness - adverse reaction:     28 min Therapeutic Exercise:  [x] See flow sheet :   Rationale: increase ROM, increase strength, improve coordination, improve balance and increase proprioception to improve the patients ability to perform ADL's with reduced pain levels. 10 min Neuromuscular Re-education:  [x]  See flow sheet :   Rationale: increase ROM, increase strength, improve coordination, improve balance and increase proprioception  to improve the patients ability to perform ADL's with reduced pain levels. With   [] TE   [] TA   [] neuro   [] other: Patient Education: [x] Review HEP    [] Progressed/Changed HEP based on:   [] positioning   [] body mechanics   [] transfers   [] heat/ice application    [] other:      Other Objective/Functional Measures: see goals     Pain Level (0-10 scale) post treatment: 1    ASSESSMENT/Changes in Function: Pt noting subjective and objective improvements toward goals. Progressed balance activities with good tolerance. Held clams due to adverse reaction after last visit. Patient will continue to benefit from skilled PT services to modify and progress therapeutic interventions, address functional mobility deficits, address ROM deficits, address strength deficits, analyze and address soft tissue restrictions, analyze and cue movement patterns, analyze and modify body mechanics/ergonomics, assess and modify postural abnormalities and instruct in home and community integration to attain remaining goals. []  See Plan of Care  [x]  See progress note/recertification  []  See Discharge Summary         Progress towards goals / Updated goals:  Short Term Goals: STG- To be accomplished in 2 week(s):  1.  Pt will be independent with HEP to encourage prophylaxis. Eval:dispensed  Current: reports compliance-- MET      Long Term Goals: LTG- To be accomplished in 6 week(s):  1.  Pt will report pain at worst does not exceed 4/10 to allow pt to return to walking >0.5 mi for fitness and health related goals.   Eval:10/10  Current:8/10 at worst-- progressing      2.  Pt will report min to no TTP at [facility] to normalize ADLs and improve quality of life. Eval:mod TTP  Current: mod remains-- not met      3.  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 Eval:  Strength (1-5)        Left Right   Hip Flexion 4 4- pain      Extension 4- 4-      Abduction 4- 3+      Adduction 4- 4   Current: flex 4+/5, abd right 4-, abd left 4/5, add left and right 4, 4/5 hip ext david--progressing toward goal      4.  Pt FOTO score will increase by >10 points to show improvement in david knee pain function.   Eval:37  Current: 48-- MET         PLAN  []  Upgrade activities as tolerated     [x]  Continue plan of care  []  Update interventions per flow sheet       []  Discharge due to:_  []  Other:_      Phil Maya, PT 8/9/2017  11:41 AM    Future Appointments  Date Time Provider Yanira Raygoza   3/22/2018 10:15 AM Lola Jaffe MD [address]

## 2017-08-15 ENCOUNTER — HOSPITAL ENCOUNTER (OUTPATIENT)
Dept: PHYSICAL THERAPY | Age: 62
Discharge: HOME | End: 2017-08-15
Payer: OTHER GOVERNMENT

## 2017-08-15 PROCEDURE — 97016 VASOPNEUMATIC DEVICE THERAPY: CPT

## 2017-08-15 PROCEDURE — 97112 NEUROMUSCULAR REEDUCATION: CPT

## 2017-08-15 PROCEDURE — 97110 THERAPEUTIC EXERCISES: CPT

## 2017-08-15 NOTE — PROGRESS NOTES
PT DAILY TREATMENT NOTE     Patient Name: Zaira Lebron  Date:8/15/2017  : 1955  [x]  Patient  Verified  [facility]:  [facility]  RETIREES AND DEPENDENTS / Product Type: Merry Leak /    In time:1200  Out time:1256  Total Treatment Time (min): 56  Visit #: 8 of 16    Treatment Area: Knee pain, right [M25.561]    SUBJECTIVE  Pain Level (0-10 scale): 6  Any medication changes, allergies to medications, adverse drug reactions, diagnosis change, or new procedure performed?: [x] No    [] Yes (see summary sheet for update)  Subjective functional status/changes:   [] No changes reported  \"I have my grand dogs, so I have to do a lot of walking. \"    OBJECTIVE    Modality rationale: decrease inflammation and decrease pain to improve the patients ability to perform ADL's with reduced pain levels.     Min Type Additional Details    [] Estim:  []Unatt       []IFC  []Premod                        []Other:  []w/ice   []w/heat  Position:  Location:    [] Estim: []Att    []TENS instruct  []NMES                    []Other:  []w/US   []w/ice   []w/heat  Position:  Location:    []  Traction: [] Cervical       []Lumbar                       [] Prone          []Supine                       []Intermittent   []Continuous Lbs:  [] before manual  [] after manual    []  Ultrasound: []Continuous   [] Pulsed                           []1MHz   []3MHz W/cm2:  Location:    []  Iontophoresis with dexamethasone         Location: [] Take home patch   [] In clinic    []  Ice     []  heat  []  Ice massage  []  Laser   []  Anodyne Position:  Location:    []  Laser with stim  []  Other:  Position:  Location:   10 [x]  Vasopneumatic Device Pressure:       [] lo [] med [] hi   Temperature: [] lo [] med [] hi   [x] Skin assessment post-treatment:  [x]intact []redness- no adverse reaction    []redness - adverse reaction:     38 min Therapeutic Exercise:  [x] See flow sheet :   Rationale: increase ROM, increase strength, improve coordination, improve balance and increase proprioception to improve the patients ability to perform ADL's with reduced pain levels.      8 min Neuromuscular Re-education:  [x]  See flow sheet :   Rationale: increase ROM, increase strength, improve coordination, improve balance and increase proprioception  to improve the patients ability to perform ADL's with reduced pain levels. With   [] TE   [] TA   [] neuro   [] other: Patient Education: [x] Review HEP    [] Progressed/Changed HEP based on:   [] positioning   [] body mechanics   [] transfers   [] heat/ice application    [] other:      Other Objective/Functional Measures:      Pain Level (0-10 scale) post treatment: 6    ASSESSMENT/Changes in Function: Added SL abd in replacement of clam due to pain- good tolerance. Moderately challenge remains with exercise. Patient will continue to benefit from skilled PT services to modify and progress therapeutic interventions, address functional mobility deficits, address ROM deficits, address strength deficits, analyze and address soft tissue restrictions, analyze and cue movement patterns, analyze and modify body mechanics/ergonomics, assess and modify postural abnormalities and instruct in home and community integration to attain remaining goals. []  See Plan of Care  []  See progress note/recertification  []  See Discharge Summary         Progress towards goals / Updated goals:  Short Term Goals: STG- To be accomplished in 2 week(s):  1.  Pt will be independent with HEP to encourage prophylaxis. Eval:dispensed  Last PN: reports compliance-- MET      Long Term Goals: LTG- To be accomplished in 6 week(s):  1.  Pt will report pain at worst does not exceed 4/10 to allow pt to return to walking >0.5 mi for fitness and health related goals.   Eval:10/10  Last PN:8/10 at worst-- progressing  Current: NT      2.  Pt will report min to no TTP at pes anserine's david to normalize ADLs and improve quality of life.  Eval:mod TTP  Last PN: mod remains-- not met  Current: NT      3.  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 Eval:  Strength (1-5)        Left Right   Hip Flexion 4 4- pain      Extension 4- 4-      Abduction 4- 3+      Adduction 4- 4   Last PN: flex 4+/5, abd right 4-, abd left 4/5, add left and right 4, 4/5 hip ext david--progressing toward goal  Current: NT      4.  Pt FOTO score will increase by >10 points to show improvement in david knee pain function.   Eval:37  Last PN: 48-- MET      PLAN  []  Upgrade activities as tolerated     [x]  Continue plan of care  []  Update interventions per flow sheet       []  Discharge due to:_  []  Other:_      Rosales Shirley PT 8/15/2017  12:17 PM    Future Appointments  Date Time Provider Yanira Raygoza   8/18/2017 12:30 PM Patsy Olivares [facility]   8/21/2017 9:30 AM VIVIENNE Estes [facility]   8/22/2017 10:30 AM VIVIENNE Olivares [facility]   9/1/2017 9:00 AM VIVIENNE Estes [facility]   3/22/2018 10:15 AM Landen Lopez MD [address] [place] [address]

## 2017-08-18 ENCOUNTER — APPOINTMENT (OUTPATIENT)
Dept: PHYSICAL THERAPY | Age: 62
End: 2017-08-18
Payer: OTHER GOVERNMENT

## 2017-08-21 ENCOUNTER — HOSPITAL ENCOUNTER (OUTPATIENT)
Dept: PHYSICAL THERAPY | Age: 62
End: 2017-08-21
Payer: OTHER GOVERNMENT

## 2017-08-22 ENCOUNTER — HOSPITAL ENCOUNTER (OUTPATIENT)
Dept: PHYSICAL THERAPY | Age: 62
End: 2017-08-22
Payer: OTHER GOVERNMENT

## 2017-08-30 ENCOUNTER — APPOINTMENT (OUTPATIENT)
Dept: PHYSICAL THERAPY | Age: 62
End: 2017-08-30
Payer: OTHER GOVERNMENT

## 2017-09-01 ENCOUNTER — HOSPITAL ENCOUNTER (OUTPATIENT)
Dept: PHYSICAL THERAPY | Age: 62
Discharge: HOME | End: 2017-09-01
Payer: OTHER GOVERNMENT

## 2017-09-01 PROCEDURE — 97110 THERAPEUTIC EXERCISES: CPT

## 2017-09-01 PROCEDURE — 97016 VASOPNEUMATIC DEVICE THERAPY: CPT

## 2017-09-01 NOTE — PROGRESS NOTES
PT DAILY TREATMENT NOTE - [facility]     Patient Name: Dinorah Garcia  Date:2017  : 1955  [x]  Patient  Verified  Payor:  / Plan: [facility]  RETIREES AND DEPENDENTS / Product Type:  /    In time: 09:07  Out time:09:53  Total Treatment Time (min): 46  Visit #: 9 of 12    Treatment Area: Knee pain, right [M25.561]    SUBJECTIVE  Pain Level (0-10 scale): 4/10  Any medication changes, allergies to medications, adverse drug reactions, diagnosis change, or new procedure performed?: [x] No    [] Yes (see summary sheet for update)  Subjective functional status/changes:   [] No changes reported  Patient is ready for discharge.      OBJECTIVE    Modality rationale: decrease inflammation and decrease pain to improve the patients ability to maneuver knee   Min Type Additional Details    [] Estim:  []Unatt       []IFC  []Premod                        []Other:  []w/ice   []w/heat  Position:  Location:    [] Estim: []Att    []TENS instruct  []NMES                    []Other:  []w/US   []w/ice   []w/heat  Position:  Location:    []  Traction: [] Cervical       []Lumbar                       [] Prone          []Supine                       []Intermittent   []Continuous Lbs:  [] before manual  [] after manual    []  Ultrasound: []Continuous   [] Pulsed                           []1MHz   []3MHz W/cm2:  Location:    []  Iontophoresis with dexamethasone         Location: [] Take home patch   [] In clinic    []  Ice     []  heat  []  Ice massage  []  Laser   []  Anodyne Position:  Location:    []  Laser with stim  []  Other:  Position:  Location:   10 []  Vasopneumatic Device Pressure:       [] lo [x] med [] hi   Temperature: [x] lo [] med [] hi   [] Skin assessment post-treatment:  []intact []redness- no adverse reaction    []redness - adverse reaction:     36 min Therapeutic Exercise:  [] See flow sheet :   Rationale: increase ROM, increase strength, improve coordination and improve balance to improve the patients ability to ambulate and negotiate steps          With   [] TE   [] TA   [] neuro   [] other: Patient Education: [x] Review HEP    [] Progressed/Changed HEP based on:   [] positioning   [] body mechanics   [] transfers   [] heat/ice application    [] other:      Other Objective/Functional Measures:   Refer to goals      Pain Level (0-10 scale) post treatment: 0/10    ASSESSMENT/Changes in Function:   Patient ready for discharge at this time. She was able to demonstrate independence with HEP and no complaints. No further skilled PT services indicated at this time. []  See Plan of Care  []  See progress note/recertification  [x]  See Discharge Summary         Progress towards goals / Updated goals:  Short Term Goals: STG- To be accomplished in 2 week(s):  1.  Pt will be independent with HEP to encourage prophylaxis. Eval:dispensed  Last PN: reports compliance and updated HEP which patient demonstrated independence with-- MET      Long Term Goals: LTG- To be accomplished in 6 week(s):  1.  Pt will report pain at worst does not exceed 4/10 to allow pt to return to walking >0.5 mi for fitness and health related goals. Eval:10/10  Last PN:8/10 at worst-- progressing  Current: 4/10 at most, GOAL MET      2.  Pt will report min to no TTP at pes anserine's david to normalize ADLs and improve quality of life. Eval:mod TTP  Last PN: mod remains-- not met  Current: minimal TTP, NOT MET      3.  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   Eval:  Strength (1-5)        Left Right   Hip Flexion 4 4- pain      Extension 4- 4-      Abduction 4- 3+      Adduction 4- 4   Last PN: flex 4+/5, abd right 4-, abd left 4/5, add left and right 4, 4/5 hip ext david--progressing toward goal  Current: left hip flexion 4+, abduction 4-, extension 4+, adduction 4+  Right hip flexion 5, abduction 4+, extension 5, adduction 5, GOAL MET except with left hip abduction      4.  Pt FOTO score will increase by >10 points to show improvement in david knee pain function.   Eval:37  Last PN: 48-- MET      PLAN  []  Upgrade activities as tolerated     [x]  Continue plan of care  []  Update interventions per flow sheet       []  Discharge due to:_  []  Other:_      Liyah Nelson 9/1/2017  9:22 AM    Future Appointments  Date Time Provider Yanira Valdezi   3/22/2018 10:15 AM Guera Marin MD [facility]

## 2017-09-01 NOTE — PROGRESS NOTES
[facility] at [address] [place] [address]  Phone: [phone]   Fax: [phone]    Discharge Summary    Patient name: Henny Healy     Start of Care: 2017  Referral source: Lennox Rung, MD    : 1955  Medical/Treatment Diagnosis: Knee pain, right [M25.561]  Onset Date:  Prior Hospitalization: see medical history   Provider#: [id number]  Comorbidities: DM, HTN, BMI   Prior Level of Function: walking 1 mile, no deficits with standing or walking prior to pain in 2017  Medications: Verified on Patient Summary List    Visits from Start of Care: 9    Missed Visits: 3  Reporting Period : 2017 to 2017    Short Term Goals: STG- To be accomplished in 2 week(s):  1.  Pt will be independent with HEP to encourage prophylaxis. Eval:dispensed  Last PN: reports compliance and updated HEP which patient demonstrated independence with-- MET      Long Term Goals: LTG- To be accomplished in 6 week(s):  1.  Pt will report pain at worst does not exceed 4/10 to allow pt to return to walking >0.5 mi for fitness and health related goals. Eval:10/10  Last PN:8/10 at worst-- progressing  Current: 4/10 at most, GOAL MET      2.  Pt will report min to no TTP at pes anserine's david to normalize ADLs and improve quality of life. Eval:mod TTP  Last PN: mod remains-- not met  Current: minimal TTP, NOT MET      3.  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   Eval:  Strength (1-5)        Left Right   Hip Flexion 4 4- pain      Extension 4- 4-      Abduction 4- 3+      Adduction 4- 4   Last PN: flex 4+/5, abd right 4-, abd left 4/5, add left and right 4, 4/5 hip ext advid--progressing toward goal  Current: left hip flexion 4+, abduction 4-, extension 4+, adduction 4+  Right hip flexion 5, abduction 4+, extension 5, adduction 5, GOAL MET except with left hip abduction      4.  Pt FOTO score will increase by >10 points to show improvement in david knee pain function. Eval:37  Last PN: 48-- MET    Assessment/ Summary of Care:   Patient ready for discharge at this time. She was able to demonstrate independence with HEP and no complaints. No further skilled PT services indicated at this time.      RECOMMENDATIONS:  [x]Discontinue therapy: [x]Patient has reached or is progressing toward set goals      []Patient is non-compliant or has abdicated      []Due to lack of appreciable progress towards set goals    Darron Magallon 9/1/2017 9:47 AM

## 2018-03-05 ENCOUNTER — HOSPITAL ENCOUNTER (OUTPATIENT)
Dept: PHYSICAL THERAPY | Age: 63
Discharge: HOME | End: 2018-03-05
Payer: OTHER GOVERNMENT

## 2018-03-05 PROCEDURE — 97162 PT EVAL MOD COMPLEX 30 MIN: CPT | Performed by: PHYSICAL THERAPIST

## 2018-03-05 PROCEDURE — 97110 THERAPEUTIC EXERCISES: CPT | Performed by: PHYSICAL THERAPIST

## 2018-03-05 NOTE — PROGRESS NOTES
[facility] at [address] [place] [address]  Phone: [phone]   Fax: [phone]    Plan of Care/ Statement of Necessity for Physical Therapy Services     Patient name: Luana James Start of Care: 3/5/2018   Referral source: Jackie Freed MD : 1955    Medical Diagnosis: Right knee pain [M25.561]   Onset Date:2018 sx TKR     Treatment Diagnosis: right knee pain s/p TKR    Prior Hospitalization: see medical history Provider#: [id number]   Medications: Verified on Patient summary List    Comorbidities: pre DM , arthritis neck , knees , right hip , previous fx arm and foot , gastric bypass sx , HBP    Prior Level of Function: pain with functional mobility but indep ambulated without assistive device     The Plan of Care and following information is based on the information from the initial evaluation. Assessment/ key information: pt is a pleasant 62/y/o female s/p TKR right knee on 2018. Pt presents with normal post op findings of decr ROM , strength (  Hip knee and ankle ) , gait slow decr WB using walker and transitioning to cane, Decrease balance , + pain. No signs of infection. Pt denies fever, no hot spots or seepage , scar is intact but ttp proximal > distal ( distal is numb) + scar tissue knots proximal and mild adherence at distal end. Pt would benefit from skilled physical therapy to address above findings and promote full recovery of functional mobility s/p TKR .      Evaluation Complexity History HIGH Complexity :3+ comorbidities / personal factors will impact the outcome/ POC ; Examination MEDIUM Complexity : 3 Standardized tests and measures addressing body structure, function, activity limitation and / or participation in recreation  ;Presentation MEDIUM Complexity : Evolving with changing characteristics  ; Clinical Decision Making MEDIUM Complexity : FOTO score of 26-74  Overall Complexity Rating: MEDIUM  Problem List: pain affecting function, decrease ROM, decrease strength, edema affecting function, impaired gait/ balance, decrease ADL/ functional abilitiies, decrease activity tolerance, decrease flexibility/ joint mobility and decrease transfer abilities   Treatment Plan may include any combination of the following: Therapeutic exercise, Therapeutic activities, Neuromuscular re-education, Physical agent/modality, Gait/balance training, Manual therapy, Aquatic therapy, Patient education, Self Care training, Functional mobility training, Home safety training and Stair training  Patient / Family readiness to learn indicated by: asking questions, trying to perform skills and interest  Persons(s) to be included in education: patient (P)  Barriers to Learning/Limitations: None  Patient Goal (s): no pain walk with no cane   Patient Self Reported Health Status: good  Rehabilitation Potential: good    Short Term Goals: To be accomplished in 3 weeks:  Short Term Goals: STG- To be accomplished in 3 week(s):  1. Pt will be compliant with HEP for max progression toward all goals and return to PLOF. Eval:started on HEP given handout of ex   Current: NA     2.Pt pain will improve >= 40% to allow pt improved sleep and tolerance to daily activity. Eval:pain up to 9/10   Current: NA     3. Pt FOTO score will increase by >=5  points to show improvement in functional mobility. Eval:TBD  Current: will address at visit 5  Long Term Goals: To be accomplished in 6 weeks:  1. Pt will be independant with HEP for continued and ongoing progression following discharge toward full functional mobility. Eval:pt started on HEP andgiven handout   Current: NA     2.Pt pain will improve >= 80% to allow pt return to PLOF. Eval:pain up to 9/10   Current: NA     3. Pt FOTO score will increase by >=10 points to show improvement in functional mobility. Eval:TBD  Current: will reassess every 5th visit      4.  Pt strength will improve to 5/5 right LE  to allow pt increase ease with gait and full functional mobility with control   Eval: strength       Left Right   Hip Flexion 4/5 3 to 3-/5      ER 3+ 4-     Abduction 5 5     Adduction 5 5   Knee Flexion 5/5 4-/5     Extension 5/5 4/5   Ankle Plantarflexion 5 3+/5     Dorsiflexion 5 4+      Current: NA     5. Pt ROM will improve with right approx = left knee  to allow smooth gait and navigation of stairs   Eval:  Knee Flexion 143/247 88/98     Extension 10 6 hyper      Current: NA    Frequency / Duration: Patient to be seen 2-3 times per week for 4-6 weeks. Patient/ Caregiver education and instruction: Diagnosis, prognosis, self care, activity modification and exercises   [x]  Plan of care has been reviewed with TRELL Clark, PT 3/5/2018 5:45 PM  _____________________________________________________________________  I certify that the above Therapy Services are being furnished while the patient is under my care. I agree with the treatment plan and certify that this therapy is necessary.     Physician's Signature:____________________  Date:__________Time:______    Please sign and return to [facility] at [address] [place] [address]  Phone: [phone]   Fax: [phone]

## 2018-03-05 NOTE — PROGRESS NOTES
PT DAILY TREATMENT NOTE/KNEE EVAL 3-16    Patient Name: Ramin Click  Date:3/5/2018  : 1955  [x]  Patient  Verified  Payor:  / Plan: [facility]  RETIREES AND DEPENDENTS / Product Type:  /    In time 26  Out time:1510  Total Treatment Time (min): 55  Total Timed Codes (min): 12  Visit #: 1 of 10-14    Treatment Area: Right knee pain [M25.561]    SUBJECTIVE  Pain Level (0-10 scale): 4 /10 ache   []constant []intermittent [x]improving []worsening []no change since onset    Any medication changes, allergies to medications, adverse drug reactions, diagnosis change, or new procedure performed?: [x] No    [] Yes (see summary sheet for update)  Subjective functional status/changes:     PLOF: ambulating without assistive device but very painful mobility prior to surgery was getting very difrficult to walk   Limitations to PLOF: now ambulating with wheeled walker and at times cane   Mechanism of Injury: chronic knee pain progressively gotten worse   Current symptoms/Complaints: right knee s/p TKR 12 Feb pain decrease ROM , swelling , pt ambulating with cane x 2 days prior to this was using walker   Previous Treatment/Compliance: therapy in past for hip leg and back but not knee   PMHx/Surgical Hx: pre DM, HBP / sx : gastric bypass , hysterectomy  Bladder surgery sling    Ortho hx : back pain pain at times in right leg , fall with fx of foot , arthritis of neck and hip left , right knee , fx right arm 3x   Work Hx: 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   Living Situation: lives with   And  sister  Pt Goals: no pain walk without assistive device   Barriers: []pain []financial []time []transportation []other  Motivation: good   Substance use: []Alcohol []Tobacco []other: none   FABQ Score: []low []elevate  Cognition: A & O x 4   Other:    OBJECTIVE/EXAMINATION  Domestic Life: lives with  and sister both able to help safe environment   Activity/Recreational Limitations: standing , walking,  transitions   Mobility: wheeled walker in community , but now transitioning to cane x 2 days , slow hesitant gait but controlled , decrease WB on right   Self Care: indep             43 min [x]Eval                  []Re-Eval       12 min Therapeutic Exercise:  [] See flow sheet :   Rationale: increase ROM, increase strength, improve coordination, improve balance and increase proprioception to improve the patients functional mobility and gait           With   [] TE   [] TA   [] neuro   [] other: Patient Education: [x] Review HEP    [] Progressed/Changed HEP based on:   [] positioning   [] body mechanics   [] transfers   [] heat/ice application    [] other:      Other Objective/Functional Measures:     Physical Therapy Evaluation - Knee    Posture: [] Varus    [] Valgus    [] Recurvatum        [] Tibial Torsion    [] Foot Supination    [] Foot Pronation    Describe:    Gait:  [] Normal    [x] Abnormal    [] Antalgic    [] NWB    Device:cane     Describe:decr WB on right , slow short  hesitant stride     ROM / Strength  [] Unable to assess         AROM /PROM                   Strength (1-5)    Left Right Left Right   Hip Flexion 128 120 4/5 3 to 3-/5     ER   3+ 4-    Abduction   5 5    Adduction   5 5   Knee Flexion 143/247 88/98 5/5 4-/5    Extension 10 6 hyper 5/5 4/5   Ankle Plantarflexion   5 3+/5    Dorsiflexion   5 4+       Flexibility: [] Unable to assess at this time  Hamstrings:    (L) Tightness= [x] WNL   [] Min   [] Mod   [] Severe    (R) Tightness= [x] WNL   [] Min   [] Mod   [] Severe  Quadriceps:    (L) Tightness= [] WNL   [] Min   [] Mod   [] Severe    (R) Tightness= [] WNL   [] Min   [] Mod   [x] Severe  Gastroc:      (L) Tightness= [x] WNL   [] Min   [] Mod   [] Severe    (R) Tightness= [x] WNL   [] Min   [] Mod   [] Severe  Other:    Palpation: main ttp along scar , No signs of infection.  Pt denies fever, no hot spots redness  or seepage , scar is intact but ttp proximal > distal ( distal is numb) + scar tissue knots proximal and mild adherence at distal end. Only mild warmth over anterior knee     Optional Tests:      Patellar Mobility - mobile       Girth Measurements:     Cm at 2 inches above   Cm supra pat    Cm infrapat  Cm at 2 \" below joint line    Left 58.5 58 50 47.5    Right  59.5 59 51.75 48.5        Other tests/comments: ex given and performed : QS , heel slides , SLR flexion 10 reps each , AP , LAQ, and sitting DF     Pain Level (0-10 scale) post treatment: 3    ASSESSMENT/Changes in Function: pt is a pleasant 62/y/o female s/p TKR right knee on 12 Feb 2018. Pt presents with normal post op findings of decr ROM , strength (  Hip knee and ankle ) , gait slow decr WB using walker and transitioning to cane, Decrease balance , + pain. No signs of infection. Pt denies fever, no hot spots or seepage , scar is intact but ttp proximal > distal ( distal is numb) + scar tissue knots proximal and mild adherence at distal end. Pt would benefit from skilled physical therapy to address above findings and promote full recovery of functional mobility s/p TKR . Patient will continue to benefit from skilled PT services to modify and progress therapeutic interventions, address functional mobility deficits, address ROM deficits, address strength deficits, analyze and address soft tissue restrictions, analyze and cue movement patterns, analyze and modify body mechanics/ergonomics and assess and modify postural abnormalities to attain remaining goals. [x]  See Plan of Care  []  See progress note/recertification  []  See Discharge Summary         Progress towards goals / Updated goals:  Short Term Goals: STG- To be accomplished in 3 week(s):  1. Pt will be compliant with HEP for max progression toward all goals and return to PLOF. Eval:started on HEP given handout of ex   Current: NA    2. Pt pain will improve >= 40% to allow pt improved sleep and tolerance to daily activity. Eval:pain up to 9/10   Current: NA    3. Pt FOTO score will increase by >=5  points to show improvement in functional mobility. Eval:TBD  Current: will address at visit 5      Long Term Goals: LTG- To be accomplished in 6 week(s):    1. Pt will be independant with HEP for continued and ongoing progression following discharge toward full functional mobility. Eval:pt started on HEP andgiven handout   Current: NA    2. Pt pain will improve >= 80% to allow pt return to PLOF. Eval:pain up to 9/10   Current: NA    3. Pt FOTO score will increase by >=10 points to show improvement in functional mobility. Eval:TBD  Current: will reassess every 5th visit     4. Pt strength will improve to 5/5 right LE  to allow pt increase ease with gait and full functional mobility with control   Eval: strength     Left Right   Hip Flexion 4/5 3 to 3-/5     ER 3+ 4-    Abduction 5 5    Adduction 5 5   Knee Flexion 5/5 4-/5    Extension 5/5 4/5   Ankle Plantarflexion 5 3+/5    Dorsiflexion 5 4+     Current: NA    5.  Pt ROM will improve with right approx = left knee  to allow smooth gait and navigation of stairs   Eval:  Knee Flexion 143/247 88/98    Extension 10 6 hyper     Current: NA    End pain 3/10     PLAN  [x]  Upgrade activities as tolerated     [x]  Continue plan of care  []  Update interventions per flow sheet       []  Discharge due to:_  []  Other:_      Brittney Hernandez, PT 3/5/2018  2:16 PM

## 2018-03-08 ENCOUNTER — HOSPITAL ENCOUNTER (OUTPATIENT)
Dept: PHYSICAL THERAPY | Age: 63
Discharge: HOME | End: 2018-03-08
Payer: OTHER GOVERNMENT

## 2018-03-08 PROCEDURE — 97016 VASOPNEUMATIC DEVICE THERAPY: CPT

## 2018-03-08 PROCEDURE — 97110 THERAPEUTIC EXERCISES: CPT

## 2018-03-08 NOTE — PROGRESS NOTES
PT DAILY TREATMENT NOTE     Patient Name: Chris Santiago  Date:3/8/2018  : 1955  [x]  Patient  Verified  Payor:  / Plan: [facility]  RETIREES AND DEPENDENTS / Product Type: [place] Cue /    In time:457  Out time:545  Total Treatment Time (min): 48  Visit #: 2 of 10-14    Treatment Area: Right knee pain [M25.561]    SUBJECTIVE  Pain Level (0-10 scale): 4/10  Any medication changes, allergies to medications, adverse drug reactions, diagnosis change, or new procedure performed?: [x] No    [] Yes (see summary sheet for update)  Subjective functional status/changes:   [] No changes reported  I'm not driving yet. I hope so soon, so my  doesn't have to bring me.     OBJECTIVE    Modality rationale: decrease edema, decrease inflammation and decrease pain to improve the patients ability to increase activity/position tolerance   Min Type Additional Details    [] Estim:  []Unatt       []IFC  []Premod                        []Other:  []w/ice   []w/heat  Position:  Location:    [] Estim: []Att    []TENS instruct  []NMES                    []Other:  []w/US   []w/ice   []w/heat  Position:  Location:    []  Traction: [] Cervical       []Lumbar                       [] Prone          []Supine                       []Intermittent   []Continuous Lbs:  [] before manual  [] after manual    []  Ultrasound: []Continuous   [] Pulsed                           []1MHz   []3MHz W/cm2:  Location:    []  Iontophoresis with dexamethasone         Location: [] Take home patch   [] In clinic    []  Ice     []  heat  []  Ice massage  []  Laser   []  Anodyne Position:  Location:    []  Laser with stim  []  Other:  Position:  Location:   10 [x]  Vasopneumatic Device Pressure:       [] lo [x] med [] hi   Temperature: [x] lo [] med [] hi   [] Skin assessment post-treatment:  []intact []redness- no adverse reaction    []redness  adverse reaction:      min []Eval                  []Re-Eval       38 min Therapeutic Exercise:  [x] See flow sheet :   Rationale: increase ROM and increase strength to improve the patients ability to normalize gait and function     min Therapeutic Activity:  []  See flow sheet :         min Neuromuscular Re-education:  []  See flow sheet :        min Manual Therapy:          min Gait Training:  ___ feet with ___ device on level surfaces with ___ level of assist   Rationale: With   [] TE   [] TA   [] neuro   [] other: Patient Education: [x] Review HEP    [] Progressed/Changed HEP based on:   [] positioning   [] body mechanics   [] transfers   [] heat/ice application    [] other:      Other Objective/Functional Measures:   FOTO (not done at eval): 24/100     Pain Level (0-10 scale) post treatment: 3/10    ASSESSMENT/Changes in Function: 1st session since eval: no new progress. Patient will continue to benefit from skilled PT services to modify and progress therapeutic interventions, address ROM deficits, address strength deficits, analyze and address soft tissue restrictions, analyze and cue movement patterns, analyze and modify body mechanics/ergonomics and assess and modify postural abnormalities to attain remaining goals. []  See Plan of Care  []  See progress note/recertification  []  See Discharge Summary         Progress towards goals / Updated goals:  Short Term Goals: STG- To be accomplished in 3 week(s):  1.  Pt will be compliant with HEP for max progression toward all goals and return to PLOF. Eval:started on HEP given handout of ex   Current: NA      2. Pt pain will improve >= 40% to allow pt improved sleep and tolerance to daily activity. Eval:pain up to 9/10   Current: NA      3. Pt FOTO score will increase by >=5  points to show improvement in functional mobility. Eval:TBD  Current: will address at visit 5  Long Term Goals: To be accomplished in 6 weeks:  1.  Pt will be independant with HEP for continued and ongoing progression following discharge toward full functional mobility. Eval:pt started on HEP andgiven handout   Current: NA      2. Pt pain will improve >= 80% to allow pt return to PLOF. Eval:pain up to 9/10   Current: NA      3. Pt FOTO score will increase by >=10 points to show improvement in functional mobility. Eval:TBD  Current: will reassess every 5th visit       4. Pt strength will improve to 5/5 right LE  to allow pt increase ease with gait and full functional mobility with control   Eval: strength         Left Right   Hip Flexion 4/5 3 to 3-/5       ER 3+ 4-      Abduction 5 5      Adduction 5 5   Knee Flexion 5/5 4-/5      Extension 5/5 4/5   Ankle Plantarflexion 5 3+/5      Dorsiflexion 5 4+       Current: NA      5.  Pt ROM will improve with right approx = left knee  to allow smooth gait and navigation of stairs   Eval:  Knee Flexion 143/247 88/98      Extension 10 6 hyper       Current: NA      PLAN  [x]  Upgrade activities as tolerated     [x]  Continue plan of care  []  Update interventions per flow sheet       []  Discharge due to:_  []  Other:_      Luciajohny Burris, PT 3/8/2018  5:03 PM    Future Appointments  Date Time Provider Yanira Raygoza   3/9/2018 8:00 AM Nelson Garcia, PT [facility]   3/12/2018 6:00 PM Lucia Warren, PT [facility]   3/15/2018 5:30 PM [address] [place] [address] THE [facility]   3/16/2018 8:00 AM [address] THE [facility]   3/19/2018 6:00 PM Lucia Warren, PT [facility]   3/22/2018 10:15 AM Eliza Galvez MD [facility]   3/22/2018 6:00 PM Lucia Warren, PT [facility]   3/26/2018 5:00 PM [address] [place] [address] THE [facility]   3/29/2018 5:00 PM Lucia Warren, PT [facility]

## 2018-03-09 ENCOUNTER — HOSPITAL ENCOUNTER (OUTPATIENT)
Dept: PHYSICAL THERAPY | Age: 63
Discharge: HOME | End: 2018-03-09
Payer: OTHER GOVERNMENT

## 2018-03-09 PROCEDURE — 97110 THERAPEUTIC EXERCISES: CPT | Performed by: PHYSICAL THERAPIST

## 2018-03-09 PROCEDURE — 97016 VASOPNEUMATIC DEVICE THERAPY: CPT | Performed by: PHYSICAL THERAPIST

## 2018-03-09 NOTE — PROGRESS NOTES
PT DAILY TREATMENT NOTE - [facility]     Patient Name: Dessie Kussmaul  Date:3/9/2018  : 1955  [x]  Patient  Verified  Payor:  / Plan: [facility]  RETIREES AND DEPENDENTS / Product Type: Rico Berliner /    In [facility]:[id number]  Out time:09  Total Treatment Time (min): 53  Total Timed Codes (min): 43     Visit #: 3 of 12    Treatment Area: Right knee pain [M25.561]    SUBJECTIVE  Pain Level (0-10 scale): 2  Any medication changes, allergies to medications, adverse drug reactions, diagnosis change, or new procedure performed?: [x] No    [] Yes (see summary sheet for update)  Subjective functional status/changes:   [] No changes reported  Pt notes tired and \"good \"sore after late afternoon session yesturday but feel good this morning.    Pt states using ice 2x day likes to stay active     OBJECTIVE  Modality rationale: decrease edema, decrease inflammation and decrease pain to improve the patients ability to increase activity/position tolerance   Min Type Additional Details     [] Estim:  []Unatt       []IFC  []Premod                        []Other:  []w/ice   []w/heat  Position:  Location:     [] Estim: []Att    []TENS instruct  []NMES                    []Other:  []w/US   []w/ice   []w/heat  Position:  Location:     []  Traction: [] Cervical       []Lumbar                       [] Prone          []Supine                       []Intermittent   []Continuous Lbs:  [] before manual  [] after manual     []  Ultrasound: []Continuous   [] Pulsed                           []1MHz   []3MHz W/cm2:  Location:     []  Iontophoresis with dexamethasone         Location: [] Take home patch   [] In clinic     []  Ice     []  heat  []  Ice massage  []  Laser   []  Anodyne Position:  Location:     []  Laser with stim  []  Other:  Position:  Location:   10 [x]  Vasopneumatic Device Pressure:       [] lo [x] med [] hi   Temperature: [x] lo [] med [] hi   [] Skin assessment post-treatment:  []intact []redness- no adverse reaction []redness  adverse reaction:      43 min Therapeutic Exercise:  [x] See flow sheet :   Rationale: increase ROM and increase strength to improve the patients ability to normalize gait and function             With   [x] TE   [] TA   [] neuro   [] other: Patient Education: [x] Review HEP    [] Progressed/Changed HEP based on:   [] positioning   [] body mechanics   [] transfers   [] heat/ice application    [] other:      Other Objective/Functional Measures: ROM right knee 111/115- 0+     Pain Level (0-10 scale) post treatment: 3    ASSESSMENT/Changes in Function: pt gait with 1 cane decrease stride / WB but good control     Patient will continue to benefit from skilled PT services to modify and progress therapeutic interventions, address functional mobility deficits, address ROM deficits, address strength deficits, analyze and address soft tissue restrictions, analyze and cue movement patterns, analyze and modify body mechanics/ergonomics, assess and modify postural abnormalities and address imbalance/dizziness to attain remaining goals. [x]  See Plan of Care  []  See progress note/recertification  []  See Discharge Summary         Progress towards goals / Updated goals:  Short Term Goals: STG- To be accomplished in 3 week(s):  1.  Pt will be compliant with HEP for max progression toward all goals and return to PLOF. Eval:started on HEP given handout of ex   Current: pt appears motivated and compliant       2. Pt pain will improve >= 40% to allow pt improved sleep and tolerance to daily activity. Eval:pain up to 9/10   Current:2-3/10 pain today , did not ask about max pain at home       3. Pt FOTO score will increase by >=5  points to show improvement in functional mobility. Eval:TBD  Current: will address at visit Jose Cruz CR 25 be accomplished in 6 weeks:  1.  Pt will be independant with HEP for continued and ongoing progression following discharge toward full functional mobility.    Eval:pt started on HEP andgiven handout   Current: NA      2. Pt pain will improve >= 80% to allow pt return to PLOF. Eval:pain up to 9/10   Current: NA      3. Pt FOTO score will increase by >=10 points to show improvement in functional mobility. Eval:TBD  Current: will reassess every 5th visit       4. Pt strength will improve to 5/5 right LE  to allow pt increase ease with gait and full functional mobility with control   Eval: strength         Left Right   Hip Flexion 4/5 3 to 3-/5       ER 3+ 4-      Abduction 5 5      Adduction 5 5   Knee Flexion 5/5 4-/5      Extension 5/5 4/5   Ankle Plantarflexion 5 3+/5      Dorsiflexion 5 4+       Current: NA      5.  Pt ROM will improve with right approx = left knee  to allow smooth gait and navigation of stairs   Eval:  Knee Flexion 143/247 88/98      Extension 10 6 hyper       Current: flexion today 111/115 flexion          PLAN  [x]  Upgrade activities as tolerated     [x]  Continue plan of care  []  Update interventions per flow sheet       []  Discharge due to:_  []  Other:_      Monae Larson PT 3/9/2018  8:08 AM    Future Appointments  Date Time Provider Yanira Raygoza   3/12/2018 6:00 PM Patsy Rodriguez [facility]   3/15/2018 5:30 PM VIVIENNE Rodriguez [facility]   3/16/2018 8:00 AM Jared ANDREW [facility]   3/19/2018 6:00 PM VIVIENNE Rodriguez [facility]   3/22/2018 10:15 AM Yan Manning MD [address] [facility]   3/22/2018 6:00 PM VIVIENNE Rodriguez [facility]   3/26/2018 5:00 PM [id number] [facility]   3/29/2018 5:00 PM VIVIENNE Rodriguez THE [facility]

## 2018-03-12 ENCOUNTER — HOSPITAL ENCOUNTER (OUTPATIENT)
Dept: PHYSICAL THERAPY | Age: 63
Discharge: HOME | End: 2018-03-12
Payer: OTHER GOVERNMENT

## 2018-03-12 PROCEDURE — 97110 THERAPEUTIC EXERCISES: CPT

## 2018-03-12 NOTE — PROGRESS NOTES
PT DAILY TREATMENT NOTE     Patient Name: Alfredo Ward  Date:3/12/2018  : 1955  [x]  Patient  Verified  Payor:  / Plan: [facility]  RETIREES AND DEPENDENTS / Product Type:  /    In time:555  Out time:633  Total Treatment Time (min): 38  Visit #: 4 of 12    Treatment Area: Right knee pain [M25.561]    SUBJECTIVE  Pain Level (0-10 scale): 4/10  Any medication changes, allergies to medications, adverse drug reactions, diagnosis change, or new procedure performed?: [x] No    [] Yes (see summary sheet for update)  Subjective functional status/changes:   [x] No changes reported      OBJECTIVE    Modality rationale:    Min Type Additional Details    [] Estim:  []Unatt       []IFC  []Premod                        []Other:  []w/ice   []w/heat  Position:  Location:    [] Estim: []Att    []TENS instruct  []NMES                    []Other:  []w/US   []w/ice   []w/heat  Position:  Location:    []  Traction: [] Cervical       []Lumbar                       [] Prone          []Supine                       []Intermittent   []Continuous Lbs:  [] before manual  [] after manual    []  Ultrasound: []Continuous   [] Pulsed                           []1MHz   []3MHz W/cm2:  Location:    []  Iontophoresis with dexamethasone         Location: [] Take home patch   [] In clinic    []  Ice     []  heat  []  Ice massage  []  Laser   []  Anodyne Position:  Location:    []  Laser with stim  []  Other:  Position:  Location:    []  Vasopneumatic Device Pressure:       [] lo [] med [] hi   Temperature: [] lo [] med [] hi   [] Skin assessment post-treatment:  []intact []redness- no adverse reaction    []redness  adverse reaction:      min []Eval                  []Re-Eval       38 min Therapeutic Exercise:  [x] See flow sheet :   Rationale: increase ROM and increase strength to improve the patients ability to normalize gait and function     min Therapeutic Activity:  []  See flow sheet :         min Neuromuscular Re-education:  []  See flow sheet :        min Manual Therapy:          min Gait Training:  ___ feet with ___ device on level surfaces with ___ level of assist   Rationale: With   [] TE   [] TA   [] neuro   [] other: Patient Education: [x] Review HEP    [] Progressed/Changed HEP based on:   [] positioning   [] body mechanics   [] transfers   [] heat/ice application    [] other:      Other Objective/Functional Measures: see goal review for strength     Pain Level (0-10 scale) post treatment: 3/10    ASSESSMENT/Changes in Function: Right knee strength improved from [facility]. Patient will continue to benefit from skilled PT services to modify and progress therapeutic interventions, address ROM deficits, address strength deficits, analyze and address soft tissue restrictions, analyze and cue movement patterns, analyze and modify body mechanics/ergonomics and assess and modify postural abnormalities to attain remaining goals. []  See Plan of Care  []  See progress note/recertification  []  See Discharge Summary         Progress towards goals / Updated goals:  Short Term Goals: STG- To be accomplished in 3 week(s):  1.  Pt will be compliant with HEP for max progression toward all goals and return to PLOF. Eval:started on HEP given handout of ex   Current: pt appears motivated and compliant       2. Pt pain will improve >= 40% to allow pt improved sleep and tolerance to daily activity. Eval:pain up to 9/10   Current:2-3/10 pain today , did not ask about max pain at home       3. Pt FOTO score will increase by >=5  points to show improvement in functional mobility. Eval:TBD  Current: will address at visit [address] [facility] be accomplished in 6 weeks:  1.  Pt will be independant with HEP for continued and ongoing progression following discharge toward full functional mobility. Eval:pt started on HEP andgiven handout   Current: NA      2. Pt pain will improve >= 80% to allow pt return to PLOF. Eval:pain up to 9/10   Current: slow progression to 7/10 at worst      3. Pt FOTO score will increase by >=10 points to show improvement in functional mobility. Eval:TBD  Current: will reassess every 5th visit       4. Pt strength will improve to 5/5 right LE  to allow pt increase ease with gait and full functional mobility with control   Eval: strength         Left Right   Hip Flexion 4/5 3 to 3-/5       ER 3+ 4-      Abduction 5 5      Adduction 5 5   Knee Flexion 5/5 4-/5      Extension 5/5 4/5   Ankle Plantarflexion 5 3+/5      Dorsiflexion 5 4+       Current: knee ext: 4+/5,  knee flex: 4+/5, hip flex: 3-/5., ankle DF: 4+/5      5.  Pt ROM will improve with right approx = left knee  to allow smooth gait and navigation of stairs   Eval:  Knee Flexion 143/247 88/98      Extension 10 6 hyper       Current: flexion today 111/115 flexion       PLAN  [x]  Upgrade activities as tolerated     [x]  Continue plan of care  []  Update interventions per flow sheet       []  Discharge due to:_  [x]  Other: FOTO next visit     Yady Grullon PT 3/12/2018  6:04 PM    Future Appointments  Date Time Provider Yanira Raygoza   3/15/2018 5:30 PM Patsy Olvera THE [facility]   3/16/2018 8:00 AM [address] THE [facility]   3/19/2018 6:00 PM VIVIENNE Olvera THE [facility]   3/22/2018 10:15 AM [facility]   3/22/2018 6:00 PM VIVIENNE Olvera THE [facility]   3/26/2018 5:00 PM Patsy Olvera THE [facility]   3/29/2018 5:00 PM VIVIENNE Olvera THE [facility]

## 2018-03-15 ENCOUNTER — HOSPITAL ENCOUNTER (OUTPATIENT)
Dept: PHYSICAL THERAPY | Age: 63
Discharge: HOME | End: 2018-03-15
Payer: OTHER GOVERNMENT

## 2018-03-15 PROCEDURE — 97110 THERAPEUTIC EXERCISES: CPT

## 2018-03-15 PROCEDURE — 97016 VASOPNEUMATIC DEVICE THERAPY: CPT

## 2018-03-15 NOTE — PROGRESS NOTES
PT DAILY TREATMENT NOTE     Patient Name: Luana James  Date:3/15/2018  : 1955  [x]  Patient  Verified  Payor:  / Plan: [facility]  RETIREES AND DEPENDENTS / Product Type: Saratha Hemp /    In time:530  Out time:615  Total Treatment Time (min): 45  Visit #: 5 of 12    Treatment Area: Right knee pain [M25.561]    SUBJECTIVE  Pain Level (0-10 scale): 5/10  Any medication changes, allergies to medications, adverse drug reactions, diagnosis change, or new procedure performed?: [x] No    [] Yes (see summary sheet for update)  Subjective functional status/changes:   [] No changes reported  I'm hurting today.     OBJECTIVE    Modality rationale: decrease edema, decrease inflammation and decrease pain to improve the patients ability to increase activity/position tolerance   Min Type Additional Details    [] Estim:  []Unatt       []IFC  []Premod                        []Other:  []w/ice   []w/heat  Position:  Location:    [] Estim: []Att    []TENS instruct  []NMES                    []Other:  []w/US   []w/ice   []w/heat  Position:  Location:    []  Traction: [] Cervical       []Lumbar                       [] Prone          []Supine                       []Intermittent   []Continuous Lbs:  [] before manual  [] after manual    []  Ultrasound: []Continuous   [] Pulsed                           []1MHz   []3MHz W/cm2:  Location:    []  Iontophoresis with dexamethasone         Location: [] Take home patch   [] In clinic    []  Ice     []  heat  []  Ice massage  []  Laser   []  Anodyne Position:  Location:    []  Laser with stim  []  Other:  Position:  Location:   10 [x]  Vasopneumatic Device Pressure:       [] lo [x] med [] hi   Temperature: [x] lo [] med [] hi   [] Skin assessment post-treatment:  []intact []redness- no adverse reaction    []redness  adverse reaction:      min []Eval                  []Re-Eval       35 min Therapeutic Exercise:  [x] See flow sheet :   Rationale: increase ROM, increase strength and increase proprioception to improve the patients ability to normalize gait and function     min Therapeutic Activity:  []  See flow sheet :         min Neuromuscular Re-education:  []  See flow sheet :        min Manual Therapy:          min Gait Training:  ___ feet with ___ device on level surfaces with ___ level of assist   Rationale: With   [] TE   [] TA   [] neuro   [] other: Patient Education: [x] Review HEP    [] Progressed/Changed HEP based on:   [] positioning   [] body mechanics   [] transfers   [] heat/ice application    [] other:      Other Objective/Functional Measures: see goal review for FOTO     Pain Level (0-10 scale) post treatment: 3/10    ASSESSMENT/Changes in Function: FOTO score improving from [facility] to meet goal.    Patient will continue to benefit from skilled PT services to modify and progress therapeutic interventions, address ROM deficits, address strength deficits, analyze and address soft tissue restrictions, analyze and cue movement patterns, analyze and modify body mechanics/ergonomics and assess and modify postural abnormalities to attain remaining goals. []  See Plan of Care  []  See progress note/recertification  []  See Discharge Summary         Progress towards goals / Updated goals:  Short Term Goals: STG- To be accomplished in 3 week(s):  1.  Pt will be compliant with HEP for max progression toward all goals and return to PLOF. Eval:started on HEP given handout of ex   Current: pt appears motivated and compliant       2. Pt pain will improve >= 40% to allow pt improved sleep and tolerance to daily activity. Eval:pain up to 9/10   Current: NA     3. Pt FOTO score will increase by >=5  points to show improvement in functional mobility. Eval:24/100  Current: MET: 34/100      Long Term Goals: To be accomplished in 6 weeks:  1.  Pt will be independant with HEP for continued and ongoing progression following discharge toward full functional mobility.    Eval:pt started on HEP andgiven handout   Current: NA      2. Pt pain will improve >= 80% to allow pt return to PLOF. Eval:pain up to 9/10   Current: slow progression to 7/10 at worst      3. Pt FOTO score will increase by >=10 points to show improvement in functional mobility. Eval: 24/100  Current:  MET: 34/100      4. Pt strength will improve to 5/5 right LE  to allow pt increase ease with gait and full functional mobility with control   Eval: strength         Left Right   Hip Flexion 4/5 3 to 3-/5       ER 3+ 4-      Abduction 5 5      Adduction 5 5   Knee Flexion 5/5 4-/5      Extension 5/5 4/5   Ankle Plantarflexion 5 3+/5      Dorsiflexion 5 4+       Current: knee ext: 4+/5,  knee flex: 4+/5, hip flex: 3-/5., ankle DF: 4+/5      5.  Pt ROM will improve with right approx = left knee  to allow smooth gait and navigation of stairs   Eval:  Knee Flexion 143/247 88/98      Extension 10 6 hyper       Current: flexion today 111/115 flexion          PLAN  [x]  Upgrade activities as tolerated     [x]  Continue plan of care  []  Update interventions per flow sheet       []  Discharge due to:_  []  Other:_      Carlin Cheadle, PT 3/15/2018  5:36 PM    Future Appointments  Date Time Provider Yanira Raygoza   3/16/2018 8:00 AM [address] THE [facility]   3/19/2018 6:00 PM Carlin Cheadle, PT [facility]   3/22/2018 10:15 AM Deni Blackwell MD [address] [facility]   3/22/2018 6:00 PM Carlin Cheadle, PT [facility]   3/26/2018 5:00 PM [address]   3/29/2018 5:00 PM Carlin Cheadle, PT [facility]

## 2018-03-16 ENCOUNTER — HOSPITAL ENCOUNTER (OUTPATIENT)
Dept: PHYSICAL THERAPY | Age: 63
Discharge: HOME | End: 2018-03-16
Payer: OTHER GOVERNMENT

## 2018-03-16 PROCEDURE — 97110 THERAPEUTIC EXERCISES: CPT

## 2018-03-16 NOTE — PROGRESS NOTES
PT DAILY TREATMENT NOTE     Patient Name: Lona Perez  Date:3/16/2018  : 1955  [x]  Patient  Verified  Payor:  / Plan: [facility]  RETIREES AND DEPENDENTS / Product Type: [place] /    In time:8:06  Out time:9:10  Total Treatment Time (min): 56  Visit #: 6 of 14    Treatment Area: Right knee pain [M25.561]    SUBJECTIVE  Pain Level (0-10 scale): 5  Any medication changes, allergies to medications, adverse drug reactions, diagnosis change, or new procedure performed?: [x] No    [] Yes (see summary sheet for update)  Subjective functional status/changes:   [] No changes reported  \"I have been in more pain over the last couple days. \"    OBJECTIVE    Modality rationale: decrease inflammation and decrease pain to improve the patients ability to perform daily activities with decreased pain and symptom levels   Min Type Additional Details    [] Estim:  []Unatt       []IFC  []Premod                        []Other:  []w/ice   []w/heat  Position:  Location:    [] Estim: []Att    []TENS instruct  []NMES                    []Other:  []w/US   []w/ice   []w/heat  Position:  Location:    []  Traction: [] Cervical       []Lumbar                       [] Prone          []Supine                       []Intermittent   []Continuous Lbs:  [] before manual  [] after manual    []  Ultrasound: []Continuous   [] Pulsed                           []1MHz   []3MHz W/cm2:  Location:    []  Iontophoresis with dexamethasone         Location: [] Take home patch   [] In clinic    []  Ice     []  heat  []  Ice massage  []  Laser   []  Anodyne Position:  Location:    []  Laser with stim  []  Other:  Position:  Location:   10 [x]  Vasopneumatic Device Pressure:       [] lo [x] med [] hi   Temperature: [] lo [] med [] hi   [x] Skin assessment post-treatment:  [x]intact []redness- no adverse reaction    []redness  adverse reaction:       46 min Therapeutic Exercise:  [x] See flow sheet :   Rationale: increase ROM, increase strength and improve coordination to improve the patients ability to perform daily activities with decreased pain and symptom levels    With   [] TE   [] TA   [] neuro   [] other: Patient Education: [x] Review HEP    [] Progressed/Changed HEP based on:   [] positioning   [] body mechanics   [] transfers   [] heat/ice application    [] other:      Other Objective/Functional Measures:   0-113* AROM  Increased tone noted in quad     Pain Level (0-10 scale) post treatment: 3    ASSESSMENT/Changes in Function: ROM continues to improve with good patella mobility noted. Added supine hip flexor stretch to address decreased flexibility. Cues to increase WB through right LE with sqauts. Patient will continue to benefit from skilled PT services to modify and progress therapeutic interventions, address functional mobility deficits, address ROM deficits, address strength deficits, analyze and modify body mechanics/ergonomics, assess and modify postural abnormalities and instruct in home and community integration to attain remaining goals. []  See Plan of Care  []  See progress note/recertification  []  See Discharge Summary         Progress towards goals / Updated goals:  Short Term Goals: STG- To be accomplished in 3 week(s):  1.  Pt will be compliant with HEP for max progression toward all goals and return to PLOF. Eval:started on HEP given handout of ex   Current: pt appears motivated and compliant       2. Pt pain will improve >= 40% to allow pt improved sleep and tolerance to daily activity. Eval:pain up to 9/10   Current: NA      3. Pt FOTO score will increase by >=5  points to show improvement in functional mobility. Eval:24/100  Current: MET: 34/100      Long Term Goals: To be accomplished in 6 weeks:  1.  Pt will be independant with HEP for continued and ongoing progression following discharge toward full functional mobility. Eval:pt started on HEP andgiven handout   Current: NA      2. Pt pain will improve >= 80% to allow pt return to PLOF. Eval:pain up to 9/10   Current: slow progression to 7/10 at worst      3. Pt FOTO score will increase by >=10 points to show improvement in functional mobility. Eval: 24/100  Current:  MET: 34/100      4. Pt strength will improve to 5/5 right LE  to allow pt increase ease with gait and full functional mobility with control   Eval: strength         Left Right   Hip Flexion 4/5 3 to 3-/5       ER 3+ 4-      Abduction 5 5      Adduction 5 5   Knee Flexion 5/5 4-/5      Extension 5/5 4/5   Ankle Plantarflexion 5 3+/5      Dorsiflexion 5 4+       Current: knee ext: 4+/5,  knee flex: 4+/5, hip flex: 3-/5., ankle DF: 4+/5      5.  Pt ROM will improve with right approx = left knee  to allow smooth gait and navigation of stairs   Eval:  Knee Flexion 143/247 88/98      Extension 10 6 hyper       Current: 113* AROM flexion        PLAN  [x]  Upgrade activities as tolerated     [x]  Continue plan of care  []  Update interventions per flow sheet       []  Discharge due to:_  []  Other:_      [facility] 3/16/2018  8:11 AM    Future Appointments  Date Time Provider Yanira Raygoza   3/19/2018 6:00 PM [place], [place] LORRIE THE [facility]   3/22/2018 10:15 AM MD Edward Houser   3/22/2018 6:00 PM [place], PT [facility]   3/26/2018 5:00 PM [address] [place] [address] THE [facility]   3/29/2018 5:00 PM [place], PT [facility]

## 2018-03-19 ENCOUNTER — HOSPITAL ENCOUNTER (OUTPATIENT)
Dept: PHYSICAL THERAPY | Age: 63
Discharge: HOME | End: 2018-03-19
Payer: OTHER GOVERNMENT

## 2018-03-19 PROCEDURE — 97110 THERAPEUTIC EXERCISES: CPT

## 2018-03-19 PROCEDURE — 97016 VASOPNEUMATIC DEVICE THERAPY: CPT

## 2018-03-19 NOTE — PROGRESS NOTES
PT DAILY TREATMENT NOTE     Patient Name: Chica Aquino  Date:3/19/2018  : 1955  [x]  Patient  Verified  Payor:  / Plan: [facility]  RETIREES AND DEPENDENTS / Product Type: Sulema Gauze /    In time:1130  Out time:1213  Total Treatment Time (min): 43  Visit #: 7 of 14    Treatment Area: Right knee pain [M25.561]    SUBJECTIVE  Pain Level (0-10 scale): 3/10  Any medication changes, allergies to medications, adverse drug reactions, diagnosis change, or new procedure performed?: [x] No    [] Yes (see summary sheet for update)  Subjective functional status/changes:   [] No changes reported  I drove here. I was a little nervous.     OBJECTIVE    Modality rationale: decrease edema, decrease inflammation and decrease pain to improve the patients ability to increase activity/position tolerance   Min Type Additional Details    [] Estim:  []Unatt       []IFC  []Premod                        []Other:  []w/ice   []w/heat  Position:  Location:    [] Estim: []Att    []TENS instruct  []NMES                    []Other:  []w/US   []w/ice   []w/heat  Position:  Location:    []  Traction: [] Cervical       []Lumbar                       [] Prone          []Supine                       []Intermittent   []Continuous Lbs:  [] before manual  [] after manual    []  Ultrasound: []Continuous   [] Pulsed                           []1MHz   []3MHz W/cm2:  Location:    []  Iontophoresis with dexamethasone         Location: [] Take home patch   [] In clinic    []  Ice     []  heat  []  Ice massage  []  Laser   []  Anodyne Position:  Location:    []  Laser with stim  []  Other:  Position:  Location:   10 [x]  Vasopneumatic Device Pressure:       [] lo [x] med [] hi   Temperature: [x] lo [] med [] hi   [] Skin assessment post-treatment:  []intact []redness- no adverse reaction    []redness  adverse reaction:      min []Eval                  []Re-Eval       33 min Therapeutic Exercise:  [x] See flow sheet :   Rationale: increase ROM, increase strength and increase proprioception to improve the patients ability to normalize gait and function     min Therapeutic Activity:  []  See flow sheet :         min Neuromuscular Re-education:  []  See flow sheet :        min Manual Therapy:         min Gait Training:  ___ feet with ___ device on level surfaces with ___ level of assist   Rationale: With   [] TE   [] TA   [] neuro   [] other: Patient Education: [x] Review HEP    [] Progressed/Changed HEP based on:   [] positioning   [] body mechanics   [] transfers   [] heat/ice application    [] other:      Other Objective/Functional Measures: none taken today     Pain Level (0-10 scale) post treatment: 3/10    ASSESSMENT/Changes in Function: Pt has begun returning to driving to normalize function. Patient will continue to benefit from skilled PT services to modify and progress therapeutic interventions, address ROM deficits, address strength deficits, analyze and address soft tissue restrictions, analyze and cue movement patterns, analyze and modify body mechanics/ergonomics and assess and modify postural abnormalities to attain remaining goals. []  See Plan of Care  []  See progress note/recertification  []  See Discharge Summary         Progress towards goals / Updated goals:  Short Term Goals: STG- To be accomplished in 3 week(s):  1.  Pt will be compliant with HEP for max progression toward all goals and return to PLOF. Eval:started on HEP given handout of ex   Current: pt appears motivated and compliant       2. Pt pain will improve >= 40% to allow pt improved sleep and tolerance to daily activity. Eval:pain up to 9/10   Current: pain at worst 7/10      3. Pt FOTO score will increase by >=5  points to show improvement in functional mobility.    Eval:24/100  Current: MET: 34/100      Long Term Goals: To be accomplished in 6 weeks:  1.  Pt will be independant with HEP for continued and ongoing progression following discharge toward full functional mobility. Eval:pt started on HEP andgiven handout   Current: NA      2. Pt pain will improve >= 80% to allow pt return to PLOF. Eval:pain up to 9/10   Current: slow progression to 7/10 at worst      3. Pt FOTO score will increase by >=10 points to show improvement in functional mobility. Eval: 24/100  Current:  MET: 34/100      4. Pt strength will improve to 5/5 right LE  to allow pt increase ease with gait and full functional mobility with control   Eval: strength         Left Right   Hip Flexion 4/5 3 to 3-/5       ER 3+ 4-      Abduction 5 5      Adduction 5 5   Knee Flexion 5/5 4-/5      Extension 5/5 4/5   Ankle Plantarflexion 5 3+/5      Dorsiflexion 5 4+       Current: knee ext: 4+/5,  knee flex: 4+/5, hip flex: 3-/5., ankle DF: 4+/5      5.  Pt ROM will improve with right approx = left knee  to allow smooth gait and navigation of stairs   Eval:  Knee Flexion 143/247 88/98      Extension 10 6 hyper       Current: 113* AROM flexion          PLAN  [x]  Upgrade activities as tolerated     [x]  Continue plan of care  []  Update interventions per flow sheet       []  Discharge due to:_  []  Other:_      Luis Doyle PT 3/19/2018  11:31 AM    Future Appointments  Date Time Provider Yanira Raygoza   3/22/2018 10:15 AM Aris Cole MD [address]   3/22/2018 6:00 PM VIVIENNE Jalloh THE [facility]   3/26/2018 5:00 PM [id number] [facility]   3/29/2018 5:00 PM VIVIENNE Jalloh THE [facility]

## 2018-03-22 ENCOUNTER — HOSPITAL ENCOUNTER (OUTPATIENT)
Dept: PHYSICAL THERAPY | Age: 63
Discharge: HOME | End: 2018-03-22
Payer: OTHER GOVERNMENT

## 2018-03-22 PROCEDURE — 97016 VASOPNEUMATIC DEVICE THERAPY: CPT

## 2018-03-22 PROCEDURE — 97110 THERAPEUTIC EXERCISES: CPT

## 2018-03-22 NOTE — PROGRESS NOTES
PT DAILY TREATMENT NOTE     Patient Name: Yasmin Luz  Date:3/22/2018  : 1955  [x]  Patient  Verified  Payor:  / Plan: [facility]  RETIREES AND DEPENDENTS / Product Type: Threasa Berkley /    In time:1254  Out time:143  Total Treatment Time (min): 49  Visit #: 8 of 14    Treatment Area: Right knee pain [M25.561]    SUBJECTIVE  Pain Level (0-10 scale): 3/10  Any medication changes, allergies to medications, adverse drug reactions, diagnosis change, or new procedure performed?: [x] No    [] Yes (see summary sheet for update)  Subjective functional status/changes:   [] No changes reported  I'm more confident with driving now.     OBJECTIVE    Modality rationale: decrease edema, decrease inflammation and decrease pain to improve the patients ability to increase activity/position tolerance   Min Type Additional Details    [] Estim:  []Unatt       []IFC  []Premod                        []Other:  []w/ice   []w/heat  Position:  Location:    [] Estim: []Att    []TENS instruct  []NMES                    []Other:  []w/US   []w/ice   []w/heat  Position:  Location:    []  Traction: [] Cervical       []Lumbar                       [] Prone          []Supine                       []Intermittent   []Continuous Lbs:  [] before manual  [] after manual    []  Ultrasound: []Continuous   [] Pulsed                           []1MHz   []3MHz W/cm2:  Location:    []  Iontophoresis with dexamethasone         Location: [] Take home patch   [] In clinic    []  Ice     []  heat  []  Ice massage  []  Laser   []  Anodyne Position:  Location:    []  Laser with stim  []  Other:  Position:  Location:   10 [x]  Vasopneumatic Device Pressure:       [] lo [x] med [] hi   Temperature: [x] lo [] med [] hi   [] Skin assessment post-treatment:  []intact []redness- no adverse reaction    []redness  adverse reaction:      min []Eval                  []Re-Eval       39 min Therapeutic Exercise:  [x] See flow sheet :   Rationale: increase ROM, increase strength, improve balance and increase proprioception to improve the patients ability to normalize gait and function     min Therapeutic Activity:  []  See flow sheet :         min Neuromuscular Re-education:  []  See flow sheet :        min Manual Therapy:          min Gait Training:  ___ feet with ___ device on level surfaces with ___ level of assist   Rationale: With   [] TE   [] TA   [] neuro   [] other: Patient Education: [x] Review HEP    [] Progressed/Changed HEP based on:   [] positioning   [] body mechanics   [] transfers   [] heat/ice application    [] other:      Other Objective/Functional Measures: none taken today     Pain Level (0-10 scale) post treatment: 2/10    ASSESSMENT/Changes in Function: Pt ambulating without AD into clinic without safety concerns. Patient will continue to benefit from skilled PT services to modify and progress therapeutic interventions, address ROM deficits, address strength deficits, analyze and address soft tissue restrictions, analyze and cue movement patterns, analyze and modify body mechanics/ergonomics and assess and modify postural abnormalities to attain remaining goals. []  See Plan of Care  []  See progress note/recertification  []  See Discharge Summary         Progress towards goals / Updated goals:  Short Term Goals: STG- To be accomplished in 3 week(s):  1.  Pt will be compliant with HEP for max progression toward all goals and return to PLOF. Eval:started on HEP given handout of ex   Current: pt appears motivated and compliant       2. Pt pain will improve >= 40% to allow pt improved sleep and tolerance to daily activity. Eval:pain up to 9/10   Current: pain at worst 7/10      3. Pt FOTO score will increase by >=5  points to show improvement in functional mobility.    Eval:24/100  Current: MET: 34/100      Long Term Goals: To be accomplished in 6 weeks:  1.  Pt will be independant with HEP for continued and ongoing progression following discharge toward full functional mobility. Eval:pt started on HEP andgiven handout   Current: NA      2. Pt pain will improve >= 80% to allow pt return to PLOF. Eval:pain up to 9/10   Current: slow progression to 7/10 at worst      3. Pt FOTO score will increase by >=10 points to show improvement in functional mobility. Eval: 24/100  Current:  MET: 34/100      4. Pt strength will improve to 5/5 right LE  to allow pt increase ease with gait and full functional mobility with control   Eval: strength         Left Right   Hip Flexion 4/5 3 to 3-/5       ER 3+ 4-      Abduction 5 5      Adduction 5 5   Knee Flexion 5/5 4-/5      Extension 5/5 4/5   Ankle Plantarflexion 5 3+/5      Dorsiflexion 5 4+       Current: knee ext: 4+/5,  knee flex: 4+/5, hip flex: 3-/5., ankle DF: 4+/5      5.  Pt ROM will improve with right approx = left knee  to allow smooth gait and navigation of stairs   Eval:  Knee Flexion 143/247 88/98      Extension 10 6 hyper       Current: 113* AROM flexion           PLAN  [x]  Upgrade activities as tolerated     [x]  Continue plan of care  []  Update interventions per flow sheet       []  Discharge due to:_  []  Other:_      Virginia Schrader PT 3/22/2018  1:01 PM    Future Appointments  Date Time Provider Yanira Raygoza   3/26/2018 12:00 PM [facility]   3/27/2018 11:45 AM Tony Hdz MD [id number]   3/29/2018 1:30 PM VIVIENNE Vasquez

## 2018-03-26 ENCOUNTER — HOSPITAL ENCOUNTER (OUTPATIENT)
Dept: PHYSICAL THERAPY | Age: 63
Discharge: HOME | End: 2018-03-26
Payer: OTHER GOVERNMENT

## 2018-03-26 PROCEDURE — 97016 VASOPNEUMATIC DEVICE THERAPY: CPT

## 2018-03-26 PROCEDURE — 97110 THERAPEUTIC EXERCISES: CPT

## 2018-03-26 NOTE — PROGRESS NOTES
PT DAILY TREATMENT NOTE     Patient Name: Chandler Yanes  Date:3/26/2018  : 1955  [x]  Patient  Verified  Payor:  / Plan: [facility]  RETIREES AND DEPENDENTS / Product Type: Alda Gonzalezgan /    In time:1203  Out time:1251  Total Treatment Time (min): 48  Visit #: 9 of 14    Treatment Area: Right knee pain [M25.561]    SUBJECTIVE  Pain Level (0-10 scale): 3/10  Any medication changes, allergies to medications, adverse drug reactions, diagnosis change, or new procedure performed?: [x] No    [] Yes (see summary sheet for update)  Subjective functional status/changes:   [x] No changes reported      OBJECTIVE    Modality rationale: decrease edema, decrease inflammation and decrease pain to improve the patients ability to increase activity/position tolerance   Min Type Additional Details    [] Estim:  []Unatt       []IFC  []Premod                        []Other:  []w/ice   []w/heat  Position:  Location:    [] Estim: []Att    []TENS instruct  []NMES                    []Other:  []w/US   []w/ice   []w/heat  Position:  Location:    []  Traction: [] Cervical       []Lumbar                       [] Prone          []Supine                       []Intermittent   []Continuous Lbs:  [] before manual  [] after manual    []  Ultrasound: []Continuous   [] Pulsed                           []1MHz   []3MHz W/cm2:  Location:    []  Iontophoresis with dexamethasone         Location: [] Take home patch   [] In clinic    []  Ice     []  heat  []  Ice massage  []  Laser   []  Anodyne Position:  Location:    []  Laser with stim  []  Other:  Position:  Location:   10 [x]  Vasopneumatic Device Pressure:       [] lo [x] med [] hi   Temperature: [x] lo [] med [] hi   [] Skin assessment post-treatment:  []intact []redness- no adverse reaction    []redness  adverse reaction:      min []Eval                  []Re-Eval       38 min Therapeutic Exercise:  [x] See flow sheet :   Rationale: increase ROM, increase strength, improve balance and increase proprioception to improve the patients ability to normalize gait and function     min Therapeutic Activity:  []  See flow sheet :         min Neuromuscular Re-education:  []  See flow sheet :        min Manual Therapy:          min Gait Training:  ___ feet with ___ device on level surfaces with ___ level of assist   Rationale: With   [] TE   [] TA   [] neuro   [] other: Patient Education: [x] Review HEP    [] Progressed/Changed HEP based on:   [] positioning   [] body mechanics   [] transfers   [] heat/ice application    [] other:      Other Objective/Functional Measures: none taken today     Pain Level (0-10 scale) post treatment: 0/10    ASSESSMENT/Changes in Function: No new progress. Patient will continue to benefit from skilled PT services to modify and progress therapeutic interventions, address ROM deficits, address strength deficits, analyze and address soft tissue restrictions, analyze and cue movement patterns, analyze and modify body mechanics/ergonomics and assess and modify postural abnormalities to attain remaining goals. []  See Plan of Care  []  See progress note/recertification  []  See Discharge Summary         Progress towards goals / Updated goals:  Short Term Goals: STG- To be accomplished in 3 week(s):  1.  Pt will be compliant with HEP for max progression toward all goals and return to PLOF. Eval:started on HEP given handout of ex   Current: pt appears motivated and compliant       2. Pt pain will improve >= 40% to allow pt improved sleep and tolerance to daily activity. Eval:pain up to 9/10   Current: pain at worst 7/10      3. Pt FOTO score will increase by >=5  points to show improvement in functional mobility. Eval:24/100  Current: MET: 34/100      Long Term Goals: To be accomplished in 6 weeks:  1.  Pt will be independant with HEP for continued and ongoing progression following discharge toward full functional mobility.    Eval:pt started on HEP andgiven handout   Current: NA      2. Pt pain will improve >= 80% to allow pt return to PLOF. Eval:pain up to 9/10   Current: slow progression to 7/10 at worst      3. Pt FOTO score will increase by >=10 points to show improvement in functional mobility. Eval: 24/100  Current:  MET: 34/100      4. Pt strength will improve to 5/5 right LE  to allow pt increase ease with gait and full functional mobility with control   Eval: strength         Left Right   Hip Flexion 4/5 3 to 3-/5       ER 3+ 4-      Abduction 5 5      Adduction 5 5   Knee Flexion 5/5 4-/5      Extension 5/5 4/5   Ankle Plantarflexion 5 3+/5      Dorsiflexion 5 4+       Current: knee ext: 4+/5,  knee flex: 4+/5, hip flex: 3-/5., ankle DF: 4+/5      5.  Pt ROM will improve with right approx = left knee  to allow smooth gait and navigation of stairs   Eval:  Knee Flexion 143/247 88/98      Extension 10 6 hyper       Current: 113* AROM flexion              PLAN  [x]  Upgrade activities as tolerated     [x]  Continue plan of care  []  Update interventions per flow sheet       []  Discharge due to:_  [x]  Other: PN next visit to continue PT      Kelsie Oconnor PT 3/26/2018  12:11 PM    Future Appointments  Date Time Provider Yanira Raygoza   3/27/2018 11:45 AM Aleena Sutton MD [address]   3/29/2018 2:00 PM Joceline Hernández PT [facility]

## 2018-03-29 ENCOUNTER — HOSPITAL ENCOUNTER (OUTPATIENT)
Dept: PHYSICAL THERAPY | Age: 63
Discharge: HOME | End: 2018-03-29
Payer: OTHER GOVERNMENT

## 2018-03-29 ENCOUNTER — APPOINTMENT (OUTPATIENT)
Dept: PHYSICAL THERAPY | Age: 63
End: 2018-03-29
Payer: OTHER GOVERNMENT

## 2018-03-29 PROCEDURE — 97110 THERAPEUTIC EXERCISES: CPT

## 2018-03-29 PROCEDURE — 97112 NEUROMUSCULAR REEDUCATION: CPT

## 2018-03-29 PROCEDURE — 97016 VASOPNEUMATIC DEVICE THERAPY: CPT

## 2018-03-29 NOTE — PROGRESS NOTES
PT DAILY TREATMENT NOTE     Patient Name: Julian Royal  Date:3/29/2018  : 1955  [x]  Patient  Verified  Payor:  / Plan: [facility]  RETIREES AND DEPENDENTS / Product Type:  /    In time:256  Out time:342  Total Treatment Time (min): 46  Visit #: 10 of 14    Treatment Area: Right knee pain [M25.561]    SUBJECTIVE  Pain Level (0-10 scale): 3/10  Any medication changes, allergies to medications, adverse drug reactions, diagnosis change, or new procedure performed?: [x] No    [] Yes (see summary sheet for update)  Subjective functional status/changes:   [] No changes reported  I saw the surgeon earlier today and he said everything looks good, he was impressed with how far I could get my leg back.   He said y'all could work on some     OBJECTIVE    Modality rationale: decrease edema, decrease inflammation and decrease pain to improve the patients ability to increase activity/position tolerance   Min Type Additional Details    [] Estim:  []Unatt       []IFC  []Premod                        []Other:  []w/ice   []w/heat  Position:  Location:    [] Estim: []Att    []TENS instruct  []NMES                    []Other:  []w/US   []w/ice   []w/heat  Position:  Location:    []  Traction: [] Cervical       []Lumbar                       [] Prone          []Supine                       []Intermittent   []Continuous Lbs:  [] before manual  [] after manual    []  Ultrasound: []Continuous   [] Pulsed                           []1MHz   []3MHz W/cm2:  Location:    []  Iontophoresis with dexamethasone         Location: [] Take home patch   [] In clinic    []  Ice     []  heat  []  Ice massage  []  Laser   []  Anodyne Position:  Location:    []  Laser with stim  []  Other:  Position:  Location:   10 [x]  Vasopneumatic Device Pressure:       [] lo [x] med [] hi   Temperature: [x] lo [] med [] hi   [] Skin assessment post-treatment:  []intact []redness- no adverse reaction    []redness  adverse reaction: min []Eval                  []Re-Eval       28 min Therapeutic Exercise:  [x] See flow sheet : to include discussion of desensitization techniques   Rationale: increase ROM and increase strength to improve the patients ability to normalize ADL's     min Therapeutic Activity:  []  See flow sheet :        8 min Neuromuscular Re-education:  [x]  See flow sheet :   Rationale: increase strength, improve balance and increase proprioception  to improve the patients ability to normalize gait and functoin     min Manual Therapy:          min Gait Training:  ___ feet with ___ device on level surfaces with ___ level of assist   Rationale: With   [] TE   [] TA   [] neuro   [] other: Patient Education: [x] Review HEP    [] Progressed/Changed HEP based on:   [] positioning   [] body mechanics   [] transfers   [] heat/ice application    [] other:      Other Objective/Functional Measures: see goal review for FOTO     Pain Level (0-10 scale) post treatment: 4/10    ASSESSMENT/Changes in Function: FOTO score continues to improve. Patient will continue to benefit from skilled PT services to modify and progress therapeutic interventions, address ROM deficits, address strength deficits, analyze and address soft tissue restrictions, analyze and cue movement patterns, analyze and modify body mechanics/ergonomics and assess and modify postural abnormalities to attain remaining goals. []  See Plan of Care  [x]  See progress note/recertification  []  See Discharge Summary         Progress towards goals / Updated goals:  Short Term Goals: STG- To be accomplished in 3 week(s):  1.  Pt will be compliant with HEP for max progression toward all goals and return to PLOF. Eval:started on HEP given handout of ex   Current: pt appears motivated and compliant       2. Pt pain will improve >= 40% to allow pt improved sleep and tolerance to daily activity. Eval:pain up to 9/10   Current: pain at worst 7/10      3.  Pt FOTO score will increase by >=5  points to show improvement in functional mobility. Eval:24/100  Current: MET: 34/100, 53/100 (3/29/18)      Long Term Goals: To be accomplished in 6 weeks:  1.  Pt will be independant with HEP for continued and ongoing progression following discharge toward full functional mobility. Eval:pt started on HEP andgiven handout   Current: progressing with independence      2. Pt pain will improve >= 80% to allow pt return to PLOF. Eval:pain up to 9/10   Current: slow progression to 7/10 at worst      3. Pt FOTO score will increase by >=10 points to show improvement in functional mobility. Eval: 24/100  Current:  MET: 34/100, 53/100 (3/29/18)      4. Pt strength will improve to 5/5 right LE  to allow pt increase ease with gait and full functional mobility with control   Eval: strength         Left Right   Hip Flexion 4/5 3 to 3-/5       ER 3+ 4-      Abduction 5 5      Adduction 5 5   Knee Flexion 5/5 4-/5      Extension 5/5 4/5   Ankle Plantarflexion 5 3+/5      Dorsiflexion 5 4+       Current: knee ext: 4+/5,  knee flex: 4+/5, hip flex: 3-/5., ankle DF: 4+/5      5. Pt ROM will improve with right approx = left knee  to allow smooth gait and navigation of stairs   Eval:  Knee Flexion 143/247 88/98      Extension 10 6 hyper       Current: 113* AROM flexion               PLAN  [x]  Upgrade activities as tolerated     [x]  Continue plan of care  []  Update interventions per flow sheet       []  Discharge due to:_  []  Other:_      Eve Moran, PT 3/29/2018  3:09 PM    No future appointments.

## 2018-03-29 NOTE — PROGRESS NOTES
[facility] at [address] [place] [address]  Phone: [phone]   Fax: [phone]    Progress Note  Patient name: Shellie Ramirez Start of Care: 3/5/18   Referral source: Christian Steele MD : 1955   Medical/Treatment Diagnosis: Right knee pain [M25.561] Onset Date:2018 sx TKR         Prior Hospitalization: see medical history Provider#: [id number]   Medications: Verified on Patient Summary List    Comorbidities: pre DM , arthritis neck , knees , right hip , previous fx arm and foot , gastric bypass sx , HBP   Prior Level of Function:pain with functional mobility but indep ambulated without assistive device     Visits from Start of Care: 10    Missed Visits: 0     Established Goals:          Excellent Good         Limited           None  [x] Increased ROM   []  [x]  []  []  [x] Increased Strength  []  [x]  []  []  [] Increased Mobility  []  []  []  []   [x] Decreased Pain   []  [x]  []  []  [] Decreased Swelling  []  []  []  []    Key Functional Changes: Pt making gains in ROM and strength and ambulating without AD into without safety concerns. Treatment included ROM/stretching and strengthening/stability. She would benefit from additional, skilled PT intervention to continued to address her deficits and progress with strength and stability to return to PLOF. Short Term Goals: STG- To be accomplished in 3 week(s):  1.  Pt will be compliant with HEP for max progression toward all goals and return to PLOF. Eval:started on HEP given handout of ex   Current: pt appears motivated and compliant       2. Pt pain will improve >= 40% to allow pt improved sleep and tolerance to daily activity. Eval:pain up to 9/10   Current: pain at worst 7/10      3. Pt FOTO score will increase by >=5  points to show improvement in functional mobility.    Eval:24  Current: MET: 34/100      Long Term Goals: To be accomplished in 6 weeks:  1.  Pt will be independant with HEP for continued and ongoing progression following discharge toward full functional mobility. Eval:pt started on HEP andgiven handout   Current: progressing with independence      2. Pt pain will improve >= 80% to allow pt return to PLOF. Eval:pain up to 9/10   Current: slow progression to 7/10 at worst      3. Pt FOTO score will increase by >=10 points to show improvement in functional mobility. Eval: 24/100  Current:  MET: 34/100      4. Pt strength will improve to 5/5 right LE  to allow pt increase ease with gait and full functional mobility with control   Eval: strength         Left Right   Hip Flexion 4/5 3 to 3-/5       ER 3+ 4-      Abduction 5 5      Adduction 5 5   Knee Flexion 5/5 4-/5      Extension 5/5 4/5   Ankle Plantarflexion 5 3+/5      Dorsiflexion 5 4+       Current: knee ext: 4+/5,  knee flex: 4+/5, hip flex: 3-/5., ankle DF: 4+/5      5. Pt ROM will improve with right approx = left knee  to allow smooth gait and navigation of stairs   Eval:  Knee Flexion 143/247 88/98      Extension 10 6 hyper       Current: 113* AROM flexion         Updated Goals: to be achieved in 4 weeks:  Continue with unmet goals above.     ASSESSMENT/RECOMMENDATIONS:  [x]Continue therapy per initial plan/protocol at a frequency of 2x per week for 4 weeks  []Continue therapy with the following recommended changes:_____________________      _____________________________________________________________________  []Discontinue therapy progressing towards or have reached established goals  []Discontinue therapy due to lack of appreciable progress towards goals  []Discontinue therapy due to lack of attendance or compliance  []Await Physician's recommendations/decisions regarding therapy  []Other:________________________________________________________________    Thank you for this referral.   Lanette Yuen, PT 3/29/2018 3:36 PM  NOTE TO PHYSICIAN:  PLEASE COMPLETE THE ORDERS BELOW AND   FAX TO [facility]: [phone]  If you are unable to process this request in 24 hours please contact our office:   [phone]  []  I have read the above report and request that my patient continue as recommended. []  I have read the above report and request that my patient continue therapy with the following changes/special instructions:________________________________________  []I have read the above report and request that my patient be discharged from therapy.     Physicians signature: ______________________________Date: ______Time:______

## 2018-04-03 ENCOUNTER — HOSPITAL ENCOUNTER (OUTPATIENT)
Dept: PHYSICAL THERAPY | Age: 63
Discharge: HOME | End: 2018-04-03
Payer: OTHER GOVERNMENT

## 2018-04-03 PROCEDURE — 97112 NEUROMUSCULAR REEDUCATION: CPT

## 2018-04-03 PROCEDURE — 97016 VASOPNEUMATIC DEVICE THERAPY: CPT

## 2018-04-03 PROCEDURE — 97110 THERAPEUTIC EXERCISES: CPT

## 2018-04-03 NOTE — PROGRESS NOTES
PT DAILY TREATMENT NOTE     Patient Name: Kat Rodgers  Date:4/3/2018  : 1955  [x]  Patient  Verified  Payor: [facility] [place] / Plan: [facility]  RETIREES AND DEPENDENTS / Product Type: Mya Mares /    In time:1025  Out time:1113  Total Treatment Time (min): 48  Visit #: 11 of 18    Treatment Area: Right knee pain [M25.561]    SUBJECTIVE  Pain Level (0-10 scale): 4/10  Any medication changes, allergies to medications, adverse drug reactions, diagnosis change, or new procedure performed?: [x] No    [] Yes (see summary sheet for update)  Subjective functional status/changes:   [] No changes reported  It's really stiff today.      OBJECTIVE    Modality rationale: decrease edema, decrease inflammation and decrease pain to improve the patients ability to increase activity/position tolerance   Min Type Additional Details    [] Estim:  []Unatt       []IFC  []Premod                        []Other:  []w/ice   []w/heat  Position:  Location:    [] Estim: []Att    []TENS instruct  []NMES                    []Other:  []w/US   []w/ice   []w/heat  Position:  Location:    []  Traction: [] Cervical       []Lumbar                       [] Prone          []Supine                       []Intermittent   []Continuous Lbs:  [] before manual  [] after manual    []  Ultrasound: []Continuous   [] Pulsed                           []1MHz   []3MHz W/cm2:  Location:    []  Iontophoresis with dexamethasone         Location: [] Take home patch   [] In clinic    []  Ice     []  heat  []  Ice massage  []  Laser   []  Anodyne Position:  Location:    []  Laser with stim  []  Other:  Position:  Location:   10 [x]  Vasopneumatic Device Pressure:       [] lo [x] med [] hi   Temperature: [x] lo [] med [] hi   [] Skin assessment post-treatment:  []intact []redness- no adverse reaction    []redness  adverse reaction:      min []Eval                  []Re-Eval       30 min Therapeutic Exercise:  [x] See flow sheet :   Rationale: increase ROM and increase strength to improve the patients ability to normalize ADL's     min Therapeutic Activity:  []  See flow sheet :        8 min Neuromuscular Re-education:  []  See flow sheet :   Rationale: increase strength and increase proprioception  to improve the patients ability to normalize gait and function     min Manual Therapy:         min Gait Training:  ___ feet with ___ device on level surfaces with ___ level of assist   Rationale: With   [] TE   [] TA   [] neuro   [] other: Patient Education: [x] Review HEP    [] Progressed/Changed HEP based on:   [] positioning   [] body mechanics   [] transfers   [] heat/ice application    [] other:      Other Objective/Functional Measures: see goal review for ROM     Pain Level (0-10 scale) post treatment: 0/10    ASSESSMENT/Changes in Function: Right knee AROM flex now [facility]. Patient will continue to benefit from skilled PT services to modify and progress therapeutic interventions, address ROM deficits, address strength deficits, analyze and address soft tissue restrictions, analyze and cue movement patterns, analyze and modify body mechanics/ergonomics and assess and modify postural abnormalities to attain remaining goals. []  See Plan of Care  []  See progress note/recertification  []  See Discharge Summary         Progress towards goals / Updated goals:  Short Term Goals: STG- To be accomplished in 3 week(s): 2. Pt pain will improve >= 40% to allow pt improved sleep and tolerance to daily activity. Eval:pain up to 9/10   Status at last note: pain at worst 7/10  Current: NT    Long Term Goals: To be accomplished in 6 weeks:  1.  Pt will be independant with HEP for continued and ongoing progression following discharge toward full functional mobility. Eval:pt started on HEP andgiven handout   Status at last note: progressing with independence  Current: NT      2. Pt pain will improve >= 80% to allow pt return to PLOF.    Eval:pain up to 9/10   Status at last note: slow progression to 7/10 at worst  Current: NT      4. Pt strength will improve to 5/5 right LE  to allow pt increase ease with gait and full functional mobility with control   Eval: strength         Left Right   Hip Flexion 4/5 3 to 3-/5       ER 3+ 4-      Abduction 5 5      Adduction 5 5   Knee Flexion 5/5 4-/5      Extension 5/5 4/5   Ankle Plantarflexion 5 3+/5      Dorsiflexion 5 4+   Status at last note: knee ext: 4+/5,  knee flex: 4+/5, hip flex: 3-/5., ankle DF: 4+/5   Current: NT      5.  Pt ROM will improve with right approx = left knee  to allow smooth gait and navigation of stairs   Eval:  Knee Flexion 143/247 88/98      Extension 10 6 hyper   Status at last note: 113* AROM flexion   Current:  120 degrees          PLAN  [x]  Upgrade activities as tolerated     [x]  Continue plan of care  []  Update interventions per flow sheet       []  Discharge due to:_  []  Other:_      Yan Pierce PT 4/3/2018  10:41 AM    Future Appointments  Date Time Provider Yanira Raygoza   4/6/2018 2:30 PM VIVIENNE Gonsalves [facility]   4/10/2018 10:30 AM VIVIENNE Gonsalves [facility]   4/12/2018 10:30 AM VIVIENNE Gonsalves [facility]

## 2018-04-06 ENCOUNTER — HOSPITAL ENCOUNTER (OUTPATIENT)
Dept: PHYSICAL THERAPY | Age: 63
Discharge: HOME | End: 2018-04-06
Payer: OTHER GOVERNMENT

## 2018-04-06 PROCEDURE — 97110 THERAPEUTIC EXERCISES: CPT

## 2018-04-06 PROCEDURE — 97016 VASOPNEUMATIC DEVICE THERAPY: CPT

## 2018-04-06 PROCEDURE — 97112 NEUROMUSCULAR REEDUCATION: CPT

## 2018-04-06 NOTE — PROGRESS NOTES
PT DAILY TREATMENT NOTE     Patient Name: Pavithra Simpson  Date:2018  : 1955  [x]  Patient  Verified  Payor:  / Plan: [facility]  RETIREES AND DEPENDENTS / Product Type: Raysa Mcguire /    In time:230  Out time:313  Total Treatment Time (min): 43  Visit #: 12 of 18    Treatment Area: Right knee pain [M25.561]    SUBJECTIVE  Pain Level (0-10 scale): 2/10  Any medication changes, allergies to medications, adverse drug reactions, diagnosis change, or new procedure performed?: [x] No    [] Yes (see summary sheet for update)  Subjective functional status/changes:   [] No changes reported  The bike bothered me again last time, so I want to do the NuStep.     OBJECTIVE    Modality rationale: decrease edema, decrease inflammation and decrease pain to improve the patients ability to increase activity/position tolerance   Min Type Additional Details    [] Estim:  []Unatt       []IFC  []Premod                        []Other:  []w/ice   []w/heat  Position:  Location:    [] Estim: []Att    []TENS instruct  []NMES                    []Other:  []w/US   []w/ice   []w/heat  Position:  Location:    []  Traction: [] Cervical       []Lumbar                       [] Prone          []Supine                       []Intermittent   []Continuous Lbs:  [] before manual  [] after manual    []  Ultrasound: []Continuous   [] Pulsed                           []1MHz   []3MHz W/cm2:  Location:    []  Iontophoresis with dexamethasone         Location: [] Take home patch   [] In clinic    []  Ice     []  heat  []  Ice massage  []  Laser   []  Anodyne Position:  Location:    []  Laser with stim  []  Other:  Position:  Location:   10 [x]  Vasopneumatic Device Pressure:       [] lo [x] med [] hi   Temperature: [x] lo [] med [] hi   [] Skin assessment post-treatment:  []intact []redness- no adverse reaction    []redness  adverse reaction:      min []Eval                  []Re-Eval       23 min Therapeutic Exercise:  [x] See flow sheet :   Rationale: increase ROM and increase strength to improve the patients ability to normalize ADL's     min Therapeutic Activity:  []  See flow sheet :        10 min Neuromuscular Re-education:  [x]  See flow sheet :   Rationale: improve coordination, improve balance and increase proprioception  to improve the patients ability to normalize gait and function     min Manual Therapy:          min Gait Training:  ___ feet with ___ device on level surfaces with ___ level of assist   Rationale: With   [] TE   [] TA   [] neuro   [] other: Patient Education: [x] Review HEP    [] Progressed/Changed HEP based on:   [] positioning   [] body mechanics   [] transfers   [] heat/ice application    [] other:      Other Objective/Functional Measures: see goal review     Pain Level (0-10 scale) post treatment: 2/10    ASSESSMENT/Changes in Function: Pain persists with hypersensitivity noted about knee/lower leg. Patient will continue to benefit from skilled PT services to modify and progress therapeutic interventions, address ROM deficits, address strength deficits, analyze and address soft tissue restrictions, analyze and cue movement patterns, analyze and modify body mechanics/ergonomics and assess and modify postural abnormalities to attain remaining goals. []  See Plan of Care  []  See progress note/recertification  []  See Discharge Summary         Progress towards goals / Updated goals:  Short Term Goals: STG- To be accomplished in 3 week(s): 2. Pt pain will improve >= 40% to allow pt improved sleep and tolerance to daily activity. Eval:pain up to 9/10   Status at last note: pain at worst 7/10  Current: no change     Long Term Goals: To be accomplished in 6 weeks:  1.  Pt will be independant with HEP for continued and ongoing progression following discharge toward full functional mobility.    Eval:pt started on HEP andgiven handout   Status at last note: progressing with independence  Current: no change      2. Pt pain will improve >= 80% to allow pt return to PLOF. Eval:pain up to 9/10   Status at last note: slow progression to 7/10 at worst  Current: no change      4. Pt strength will improve to 5/5 right LE  to allow pt increase ease with gait and full functional mobility with control   Eval: strength         Left Right   Hip Flexion 4/5 3 to 3-/5       ER 3+ 4-      Abduction 5 5      Adduction 5 5   Knee Flexion 5/5 4-/5      Extension 5/5 4/5   Ankle Plantarflexion 5 3+/5      Dorsiflexion 5 4+   Status at last note: knee ext: 4+/5,  knee flex: 4+/5, hip flex: 3-/5., ankle DF: 4+/5   Current: hip flex: 4-/5      5.  Pt ROM will improve with right approx = left knee  to allow smooth gait and navigation of stairs   Eval:  Knee Flexion 143/247 88/98      Extension 10 6 hyper   Status at last note: 113* AROM flexion   Current:  120 degrees      PLAN  [x]  Upgrade activities as tolerated     [x]  Continue plan of care  []  Update interventions per flow sheet       []  Discharge due to:_  []  Other:_      Deidra Carrillo PT 4/6/2018  1:58 PM    Future Appointments  Date Time Provider Yanira Raygoza   4/6/2018 2:30 PM VIVIENNE Sorensen [facility]   4/10/2018 10:30 AM VIVIENNE Sorensen [facility]   4/12/2018 10:30 AM VIVIENNE Sorensen [facility]

## 2018-04-10 ENCOUNTER — HOSPITAL ENCOUNTER (OUTPATIENT)
Dept: PHYSICAL THERAPY | Age: 63
Discharge: HOME | End: 2018-04-10
Payer: OTHER GOVERNMENT

## 2018-04-10 PROCEDURE — 97112 NEUROMUSCULAR REEDUCATION: CPT

## 2018-04-10 PROCEDURE — 97016 VASOPNEUMATIC DEVICE THERAPY: CPT

## 2018-04-10 PROCEDURE — 97110 THERAPEUTIC EXERCISES: CPT

## 2018-04-10 NOTE — PROGRESS NOTES
PT DAILY TREATMENT NOTE     Patient Name: Betty Delarosa  Date:4/10/2018  : 1955  [x]  Patient  Verified  Payor:  / Plan: [facility]  RETIREES AND DEPENDENTS / Product Type:  /    In time:1035  Out time:1118  Total Treatment Time (min): 48  Visit #: 13 of 18    Treatment Area: Right knee pain [M25.561]    SUBJECTIVE  Pain Level (0-10 scale): 0/10  Any medication changes, allergies to medications, adverse drug reactions, diagnosis change, or new procedure performed?: [x] No    [] Yes (see summary sheet for update)  Subjective functional status/changes:   [x] No changes reported      OBJECTIVE    Modality rationale: decrease edema, decrease inflammation and decrease pain to improve the patients ability to increase activity/position tolerance   Min Type Additional Details    [] Estim:  []Unatt       []IFC  []Premod                        []Other:  []w/ice   []w/heat  Position:  Location:    [] Estim: []Att    []TENS instruct  []NMES                    []Other:  []w/US   []w/ice   []w/heat  Position:  Location:    []  Traction: [] Cervical       []Lumbar                       [] Prone          []Supine                       []Intermittent   []Continuous Lbs:  [] before manual  [] after manual    []  Ultrasound: []Continuous   [] Pulsed                           []1MHz   []3MHz W/cm2:  Location:    []  Iontophoresis with dexamethasone         Location: [] Take home patch   [] In clinic    []  Ice     []  heat  []  Ice massage  []  Laser   []  Anodyne Position:  Location:    []  Laser with stim  []  Other:  Position:  Location:    []  Vasopneumatic Device Pressure:       [] lo [] med [] hi   Temperature: [] lo [] med [] hi   [] Skin assessment post-treatment:  []intact []redness- no adverse reaction    []redness  adverse reaction:      min []Eval                  []Re-Eval       28 min Therapeutic Exercise:  [x] See flow sheet :   Rationale: increase ROM and increase strength to improve the patients ability to normalize ADL's     min Therapeutic Activity:  []  See flow sheet :        10 min Neuromuscular Re-education:  [x]  See flow sheet :   Rationale: increase strength, improve coordination, improve balance and increase proprioception  to improve the patients ability to normalize gait and function     min Manual Therapy:        min Gait Training:  ___ feet with ___ device on level surfaces with ___ level of assist   Rationale: With   [] TE   [] TA   [] neuro   [] other: Patient Education: [x] Review HEP    [] Progressed/Changed HEP based on:   [] positioning   [] body mechanics   [] transfers   [] heat/ice application    [] other:      Other Objective/Functional Measures: none taken today     Pain Level (0-10 scale) post treatment: 0/10    ASSESSMENT/Changes in Function: No new progress. Patient will continue to benefit from skilled PT services to modify and progress therapeutic interventions, address ROM deficits, address strength deficits, analyze and address soft tissue restrictions, analyze and cue movement patterns, analyze and modify body mechanics/ergonomics and assess and modify postural abnormalities to attain remaining goals. []  See Plan of Care  []  See progress note/recertification  []  See Discharge Summary         Progress towards goals / Updated goals:  Short Term Goals: STG- To be accomplished in 3 week(s): 2. Pt pain will improve >= 40% to allow pt improved sleep and tolerance to daily activity. Eval:pain up to 9/10   Status at last note: pain at worst 7/10  Current: no change      Long Term Goals: To be accomplished in 6 weeks:  1.  Pt will be independant with HEP for continued and ongoing progression following discharge toward full functional mobility. Eval:pt started on HEP andgiven handout   Status at last note: progressing with independence  Current: no change      2. Pt pain will improve >= 80% to allow pt return to PLOF.    Eval:pain up to 9/10   Status at last note: slow progression to 7/10 at worst  Current: no change      4. Pt strength will improve to 5/5 right LE  to allow pt increase ease with gait and full functional mobility with control   Eval: strength         Left Right   Hip Flexion 4/5 3 to 3-/5       ER 3+ 4-      Abduction 5 5      Adduction 5 5   Knee Flexion 5/5 4-/5      Extension 5/5 4/5   Ankle Plantarflexion 5 3+/5      Dorsiflexion 5 4+   Status at last note: knee ext: 4+/5,  knee flex: 4+/5, hip flex: 3-/5., ankle DF: 4+/5   Current: hip flex: 4-/5      5.  Pt ROM will improve with right approx = left knee  to allow smooth gait and navigation of stairs   Eval:  Knee Flexion 143/247 88/98      Extension 10 6 hyper   Status at last note: 113* AROM flexion   Current:  120 degrees       PLAN  [x]  Upgrade activities as tolerated     [x]  Continue plan of care  []  Update interventions per flow sheet       []  Discharge due to:_  []  Other:_      Nash Alfaro PT 4/10/2018  10:40 AM    Future Appointments  Date Time Provider Yanira Raygoza   4/12/2018 10:30 AM [id number]

## 2018-04-12 ENCOUNTER — HOSPITAL ENCOUNTER (OUTPATIENT)
Dept: PHYSICAL THERAPY | Age: 63
Discharge: HOME | End: 2018-04-12
Payer: OTHER GOVERNMENT

## 2018-04-12 PROCEDURE — 97110 THERAPEUTIC EXERCISES: CPT

## 2018-04-12 PROCEDURE — 97112 NEUROMUSCULAR REEDUCATION: CPT

## 2018-04-12 PROCEDURE — 97016 VASOPNEUMATIC DEVICE THERAPY: CPT

## 2018-04-12 NOTE — PROGRESS NOTES
PT DAILY TREATMENT NOTE     Patient Name: Betty Delarosa  Date:2018  : 1955  [x]  Patient  Verified  Payor: [employer] / Plan: [facility] / Product Type: Marleni Tuttle /    In time:1034  Out time:1116  Total Treatment Time (min): 42  Visit #: 14 of 18    Treatment Area: Right knee pain [M25.561]    SUBJECTIVE  Pain Level (0-10 scale): 3/10  Any medication changes, allergies to medications, adverse drug reactions, diagnosis change, or new procedure performed?: [x] No    [] Yes (see summary sheet for update)  Subjective functional status/changes:   [x] No changes reported      OBJECTIVE    Modality rationale: decrease edema, decrease inflammation and decrease pain to improve the patients ability to increase activity/position tolerance   Min Type Additional Details    [] Estim:  []Unatt       []IFC  []Premod                        []Other:  []w/ice   []w/heat  Position:  Location:    [] Estim: []Att    []TENS instruct  []NMES                    []Other:  []w/US   []w/ice   []w/heat  Position:  Location:    []  Traction: [] Cervical       []Lumbar                       [] Prone          []Supine                       []Intermittent   []Continuous Lbs:  [] before manual  [] after manual    []  Ultrasound: []Continuous   [] Pulsed                           []1MHz   []3MHz W/cm2:  Location:    []  Iontophoresis with dexamethasone         Location: [] Take home patch   [] In clinic    []  Ice     []  heat  []  Ice massage  []  Laser   []  Anodyne Position:  Location:    []  Laser with stim  []  Other:  Position:  Location:   10 [x]  Vasopneumatic Device Pressure:       [] lo [x] med [] hi   Temperature: [x] lo [] med [] hi   [] Skin assessment post-treatment:  []intact []redness- no adverse reaction    []redness  adverse reaction:      min []Eval                  []Re-Eval       23 min Therapeutic Exercise:  [x] See flow sheet :   Rationale: increase ROM and increase strength to improve the patients ability to normalize ADL's     min Therapeutic Activity:  []  See flow sheet :        9 min Neuromuscular Re-education:  [x]  See flow sheet :   Rationale: increase strength and increase proprioception  to improve the patients ability to normalize gait and function     min Manual Therapy:          min Gait Training:  ___ feet with ___ device on level surfaces with ___ level of assist   Rationale: With   [] TE   [] TA   [] neuro   [] other: Patient Education: [x] Review HEP    [] Progressed/Changed HEP based on:   [] positioning   [] body mechanics   [] transfers   [] heat/ice application    [] other:      Other Objective/Functional Measures: none taken today     Pain Level (0-10 scale) post treatment: 2-3/10    ASSESSMENT/Changes in Function: No new progress. Patient will continue to benefit from skilled PT services to modify and progress therapeutic interventions, address ROM deficits, address strength deficits, analyze and address soft tissue restrictions, analyze and cue movement patterns, analyze and modify body mechanics/ergonomics and assess and modify postural abnormalities to attain remaining goals. []  See Plan of Care  []  See progress note/recertification  []  See Discharge Summary         Progress towards goals / Updated goals:  Short Term Goals: STG- To be accomplished in 3 week(s): 2. Pt pain will improve >= 40% to allow pt improved sleep and tolerance to daily activity. Eval:pain up to 9/10   Status at last note: pain at worst 7/10  Current: no change      Long Term Goals: To be accomplished in 6 weeks:  1.  Pt will be independant with HEP for continued and ongoing progression following discharge toward full functional mobility. Eval:pt started on HEP andgiven handout   Status at last note: progressing with independence  Current: no change      2. Pt pain will improve >= 80% to allow pt return to PLOF.    Eval:pain up to 9/10   Status at last note: slow progression to 7/10 at worst  Current: no change      4. Pt strength will improve to 5/5 right LE  to allow pt increase ease with gait and full functional mobility with control   Eval: strength         Left Right   Hip Flexion 4/5 3 to 3-/5       ER 3+ 4-      Abduction 5 5      Adduction 5 5   Knee Flexion 5/5 4-/5      Extension 5/5 4/5   Ankle Plantarflexion 5 3+/5      Dorsiflexion 5 4+   Status at last note: knee ext: 4+/5,  knee flex: 4+/5, hip flex: 3-/5., ankle DF: 4+/5   Current: hip flex: 4-/5      5.  Pt ROM will improve with right approx = left knee  to allow smooth gait and navigation of stairs   Eval:  Knee Flexion 143/247 88/98      Extension 10 6 hyper   Status at last note: 113* AROM flexion   Current:  120 degrees      PLAN  [x]  Upgrade activities as tolerated     [x]  Continue plan of care  []  Update interventions per flow sheet       []  Discharge due to:_  []  Other:_      Arlien Gee, PT 4/12/2018  10:36 AM    Future Appointments  Date Time Provider Yanira Raygoza   4/19/2018 2:15 PM MD Edward Warner

## 2018-04-17 ENCOUNTER — HOSPITAL ENCOUNTER (OUTPATIENT)
Dept: PHYSICAL THERAPY | Age: 63
Discharge: HOME | End: 2018-04-17
Payer: OTHER GOVERNMENT

## 2018-04-17 ENCOUNTER — APPOINTMENT (OUTPATIENT)
Dept: PHYSICAL THERAPY | Age: 63
End: 2018-04-17
Payer: OTHER GOVERNMENT

## 2018-04-17 PROCEDURE — 97110 THERAPEUTIC EXERCISES: CPT

## 2018-04-17 PROCEDURE — 97016 VASOPNEUMATIC DEVICE THERAPY: CPT

## 2018-04-17 PROCEDURE — 97112 NEUROMUSCULAR REEDUCATION: CPT

## 2018-04-17 NOTE — PROGRESS NOTES
PT DAILY TREATMENT NOTE     Patient Name: Mary Baldwin  Date:2018  : 1955  [x]  Patient  Verified  Payor: LIVE / Plan: Marc Holland 73 / Product Type:  /    In time:1055  Out time:1140  Total Treatment Time (min): 45  Visit #: 15 of 18    Treatment Area: Right knee pain [M25.561]    SUBJECTIVE  Pain Level (0-10 scale): 3/10  Any medication changes, allergies to medications, adverse drug reactions, diagnosis change, or new procedure performed?: [x] No    [] Yes (see summary sheet for update)  Subjective functional status/changes:   [x] No changes reported      OBJECTIVE    Modality rationale: decrease edema, decrease inflammation and decrease pain to improve the patients ability to increase activity/position tolerance   Min Type Additional Details    [] Estim:  []Unatt       []IFC  []Premod                        []Other:  []w/ice   []w/heat  Position:  Location:    [] Estim: []Att    []TENS instruct  []NMES                    []Other:  []w/US   []w/ice   []w/heat  Position:  Location:    []  Traction: [] Cervical       []Lumbar                       [] Prone          []Supine                       []Intermittent   []Continuous Lbs:  [] before manual  [] after manual    []  Ultrasound: []Continuous   [] Pulsed                           []1MHz   []3MHz W/cm2:  Location:    []  Iontophoresis with dexamethasone         Location: [] Take home patch   [] In clinic    []  Ice     []  heat  []  Ice massage  []  Laser   []  Anodyne Position:  Location:    []  Laser with stim  []  Other:  Position:  Location:    []  Vasopneumatic Device Pressure:       [] lo [] med [] hi   Temperature: [] lo [] med [] hi   [] Skin assessment post-treatment:  []intact []redness- no adverse reaction    []redness  adverse reaction:      min []Eval                  []Re-Eval       25 min Therapeutic Exercise:  [x] See flow sheet :   Rationale: increase ROM and increase strength to improve the patients ability to normalize ADL's     min Therapeutic Activity:  []  See flow sheet :        10 min Neuromuscular Re-education:  [x]  See flow sheet :   Rationale: increase strength, improve coordination, improve balance and increase proprioception  to improve the patients ability to normalize gait and function     min Manual Therapy:          min Gait Training:  ___ feet with ___ device on level surfaces with ___ level of assist   Rationale: With   [] TE   [] TA   [] neuro   [] other: Patient Education: [x] Review HEP    [] Progressed/Changed HEP based on:   [] positioning   [] body mechanics   [] transfers   [] heat/ice application    [] other:      Other Objective/Functional Measures:   FOTO score: 47/100     Pain Level (0-10 scale) post treatment: 1/10    ASSESSMENT/Changes in Function: FOTO score regressed from previous assess. Patient will continue to benefit from skilled PT services to modify and progress therapeutic interventions, address ROM deficits, address strength deficits, analyze and address soft tissue restrictions, analyze and cue movement patterns, analyze and modify body mechanics/ergonomics and assess and modify postural abnormalities to attain remaining goals. []  See Plan of Care  []  See progress note/recertification  []  See Discharge Summary         Progress towards goals / Updated goals:  Short Term Goals: STG- To be accomplished in 3 week(s): 2. Pt pain will improve >= 40% to allow pt improved sleep and tolerance to daily activity. Eval:pain up to 9/10   Status at last note: pain at worst 7/10  Current: no change      Long Term Goals: To be accomplished in 6 weeks:  1.  Pt will be independant with HEP for continued and ongoing progression following discharge toward full functional mobility. Eval:pt started on HEP andgiven handout   Status at last note: progressing with independence  Current: no change      2. Pt pain will improve >= 80% to allow pt return to PLOF.    Eval:pain up to 9/10   Status at last note: slow progression to 7/10 at worst  Current: no change      4. Pt strength will improve to 5/5 right LE  to allow pt increase ease with gait and full functional mobility with control   Eval: strength         Left Right   Hip Flexion 4/5 3 to 3-/5       ER 3+ 4-      Abduction 5 5      Adduction 5 5   Knee Flexion 5/5 4-/5      Extension 5/5 4/5   Ankle Plantarflexion 5 3+/5      Dorsiflexion 5 4+   Status at last note: knee ext: 4+/5,  knee flex: 4+/5, hip flex: 3-/5., ankle DF: 4+/5   Current: hip flex: 4-/5      5.  Pt ROM will improve with right approx = left knee  to allow smooth gait and navigation of stairs   Eval:  Knee Flexion 143/247 88/98      Extension 10 6 hyper   Status at last note: 113* AROM flexion   Current:  120 degrees      PLAN  [x]  Upgrade activities as tolerated     [x]  Continue plan of care  []  Update interventions per flow sheet       []  Discharge due to:_  []  Other:_      Misti Patel PT 4/17/2018  10:53 AM    Future Appointments  Date Time Provider Yanira Raygoza   4/17/2018 11:00 AM VIVIENNE Cooper THE [facility]   4/19/2018 11:00 AM VIVIENNE Cooper THE [facility]   4/19/2018 2:15 PM Jaylan Georges MD [facility]   4/24/2018 11:00 AM VIVIENNE Cooper THE [facility]   4/26/2018 11:00 AM VIVIENNE Cooper THE [facility]

## 2018-04-19 ENCOUNTER — HOSPITAL ENCOUNTER (OUTPATIENT)
Dept: PHYSICAL THERAPY | Age: 63
Discharge: HOME | End: 2018-04-19
Payer: OTHER GOVERNMENT

## 2018-04-19 ENCOUNTER — APPOINTMENT (OUTPATIENT)
Dept: PHYSICAL THERAPY | Age: 63
End: 2018-04-19
Payer: OTHER GOVERNMENT

## 2018-04-19 PROCEDURE — 97112 NEUROMUSCULAR REEDUCATION: CPT | Performed by: PHYSICAL THERAPIST

## 2018-04-19 PROCEDURE — 97110 THERAPEUTIC EXERCISES: CPT | Performed by: PHYSICAL THERAPIST

## 2018-04-19 PROCEDURE — 97016 VASOPNEUMATIC DEVICE THERAPY: CPT | Performed by: PHYSICAL THERAPIST

## 2018-04-19 NOTE — PROGRESS NOTES
[facility] at [address] [place] [address]  Phone: [phone]   Fax: [phone]    Continued Plan of Care/ Progress note for Physical Therapy Services    Patient name: Abilio Kiran Start of Care: 3/5/18   Referral source: Edda Griggs MD : 1955   Medical/Treatment Diagnosis: Right knee pain [M25.561] Onset Date:2018 sx TKR      Prior Hospitalization: see medical history Provider#: [id number]   Medications: Verified on Patient Summary List    Comorbidities: pre DM , arthritis neck , knees , right hip , previous fx arm and foot , gastric bypass sx , HBP   Prior Level of Function:pain with functional mobility but indep ambulated without assistive device     Visits from Start of Care: 16    Missed Visits: 0    The Plan of Care and following information is based on the patient's current status:  Short Term Goals: STG- To be accomplished in 3 week(s): 1. Pt pain will improve >= 40% to allow pt improved sleep and tolerance to daily activity. Eval:pain up to 9/10   Status at last note: pain at worst 7/10  Current: average only 2-3/10 highest 4/10 able to sleep through the night MET      Long Term Goals: To be accomplished in 6 weeks:  1.  Pt will be independant with HEP for continued and ongoing progression following discharge toward full functional mobility. Eval:pt started on HEP andgiven handout   Status at last note: progressing with independence  Current: pt reports compliance with HEP minimal cues needed with control and speed with exercises       2. Pt pain will improve >= 80% to allow pt return to PLOF. Eval:pain up to 9/10   Status at last note: slow progression to 7/10 at worst  Current: average only 2-3/10 highest 4/10 able to sleep through the night, progressing      4.  Pt strength will improve to 5/5 right LE  to allow pt increase ease with gait and full functional mobility with control   Eval: strength         Left Right   Hip Flexion 4/5 3 to 3-/5       ER 3+ 4-      Abduction 5 5      Adduction 5 5   Knee Flexion 5/5 4-/5      Extension 5/5 4/5   Ankle Plantarflexion 5 3+/5      Dorsiflexion 5 4+   Status at last note: knee ext: 4+/5,  knee flex: 4+/5, hip flex: 3-/5., ankle DF: 4+/5   Current: hip flex: 4 to 4+/5, knee flexion 4+ , knee extension 4+ to 5-/5 , DF 4 to 4+/5 progressing       5.  Pt ROM will improve with right approx = left knee  to allow smooth gait and navigation of stairs   Eval:  Knee Flexion 143/147 88/98      Extension 10 6 hyper   Status at last note: 113* AROM flexion   Current:  120 degrees flexion , 8 deg hyperextension progressing           Key functional changes:  8 deg hyperext to 21 deg of flexion right knee ,                          hip flex: 4 to 4+/5, knee flexion 4+ , knee extension 4+ to 5-/5 , DF 4 to 4+/5   Pt given ankle strength 4 way green tband ex for improvement control and stability whole leg     pt progressing very well , pt reports feels leg is heavy and some difficulty with stepping up on a curb and notes right ankle feels unstable at times but slowly improving , ROM and strength improving as noted by goal report and pain is low    Problems/ barriers to goal attainment: no barriers continue to progress well     Problem List: pain affecting function, decrease ROM, decrease strength, impaired gait/ balance, decrease ADL/ functional abilitiies, decrease activity tolerance, decrease flexibility/ joint mobility and decrease transfer abilities    Treatment Plan: Therapeutic exercise, Therapeutic activities, Neuromuscular re-education, Physical agent/modality, Gait/balance training, Manual therapy, Aquatic therapy, Patient education, Self Care training, Functional mobility training, Home safety training and Stair training     Patient Goal (s) has been updated and includes: \"improved walking and ability to safely get up and down stairs and on curbs \"     Goals for this certification period to be accomplished in 4 weeks:  Continue toward unmet goals     Frequency / Duration: Patient to be seen 2 times per week for 4 weeks:    Assessment / Recommendations:continue to work toward goals and return to full function with safe and smooth gait. ( pt still using cane at times for balance safety )     nAn-Marie Ramirez, PT 4/19/2018 10:55 AM    ________________________________________________________________________  I certify that the above Therapy Services are being furnished while the patient is under my care. I agree with the treatment plan and certify that this therapy is necessary. [] I have read the above and request that my patient continue as recommended.   [] I have read the above report and request that my patient continue therapy with the following changes/special instructions: ______________________________________  [] I have read the above report and request that my patient be discharged from therapy    Physician's Signature:_______________________________Date:___________Time:__________    Please sign and return to   [facility] at [address]     Phone: [phone]   Fax: [phone]

## 2018-04-19 NOTE — PROGRESS NOTES
PT DAILY TREATMENT NOTE 12    Patient Name: Abilio Kiran  Date:2018  : 1955  [x]  Patient  Verified  Payor: LIVE / Plan: Marc Holland 73 / Product Type: Venkat Yadav /    In CUMT:6333  Out time:1050  Total Treatment Time (min): 68  Visit #: 16 of 18    Treatment Area: Right knee pain [M25.561]    SUBJECTIVE  Pain Level (0-10 scale): 1  Any medication changes, allergies to medications, adverse drug reactions, diagnosis change, or new procedure performed?: [x] No    [] Yes (see summary sheet for update)  Subjective functional status/changes:   [] No changes reported  Pt reports highest pain in the last 2 weeks only up to 4/10 at most , but usually averages 2-3/10     OBJECTIVE    Modality rationale: decrease inflammation and decrease pain to improve the patients ability to return to PLOF with minimal to no pain    Min Type Additional Details    [] Estim:  []Unatt       []IFC  []Premod                        []Other:  []w/ice   []w/heat  Position:  Location:    [] Estim: []Att    []TENS instruct  []NMES                    []Other:  []w/US   []w/ice   []w/heat  Position:  Location:    []  Traction: [] Cervical       []Lumbar                       [] Prone          []Supine                       []Intermittent   []Continuous Lbs:  [] before manual  [] after manual    []  Ultrasound: []Continuous   [] Pulsed                           []1MHz   []3MHz W/cm2:  Location:    []  Iontophoresis with dexamethasone         Location: [] Take home patch   [] In clinic    []  Ice     []  heat  []  Ice massage  []  Laser   []  Anodyne Position:  Location:    []  Laser with stim  []  Other:  Position:  Location:   15 [x]  Vasopneumatic Device Pressure:       [] lo [x] med [] hi   Temperature: [x] lo [] med [] hi   [] Skin assessment post-treatment:  []intact []redness- no adverse reaction    []redness  adverse reaction:         36 min Therapeutic Exercise:  [x] See flow sheet :   Rationale: increase ROM and increase strength to improve the patients ability to normalize ADL's        25 min Neuromuscular Re-education:  [x]  See flow sheet :   Rationale: increase strength, improve coordination, improve balance and increase proprioception  to improve the patients ability to normalize gait and function          With   [] TE   [] TA   [] neuro   [] other: Patient Education: [x] Review HEP    [] Progressed/Changed HEP based on:   [] positioning   [] body mechanics   [] transfers   [] heat/ice application    [] other:      Other Objective/Functional Measures: 8 deg hyperext to 21 deg of flexion right knee ,     hip flex: 4 to 4+/5, knee flexion 4+ , knee extension 4+ to 5-/5 , DF 4 to 4+/5   Pt given ankle strength 4 way green tband ex for improvement control and stability whole leg     Pain Level (0-10 scale) post treatment: 0    ASSESSMENT/Changes in Function: pt progressing very well , pt reports feels leg is heavy and some difficulty with stepping up on a curb and notes right ankle feels unstable at times but slowly improving , ROM and strength improving as noted by goal report and pain is low. Cues to keep knee straight with SLR and standing SLR against tband     Patient will continue to benefit from skilled PT services to modify and progress therapeutic interventions, address functional mobility deficits, address ROM deficits, address strength deficits, analyze and address soft tissue restrictions, analyze and cue movement patterns, analyze and modify body mechanics/ergonomics, assess and modify postural abnormalities and address imbalance/dizziness to attain remaining goals. [x]  See Plan of Care  []  See progress note/recertification  []  See Discharge Summary         Progress towards goals / Updated goals:  Short Term Goals: STG- To be accomplished in 3 week(s): 1. Pt pain will improve >= 40% to allow pt improved sleep and tolerance to daily activity.   Eval:pain up to 9/10   Status at last note: pain at worst 7/10  Current: average only 2-3/10 highest 4/10 able to sleep through the night MET      Long Term Goals: To be accomplished in 6 weeks:  1.  Pt will be independant with HEP for continued and ongoing progression following discharge toward full functional mobility. Eval:pt started on HEP andgiven handout   Status at last note: progressing with independence  Current: pt reports compliance with HEP minimal cues needed with control and speed with exercises       2. Pt pain will improve >= 80% to allow pt return to OF. Eval:pain up to 9/10   Status at last note: slow progression to 7/10 at worst  Current: average only 2-3/10 highest 4/10 able to sleep through the night, progressing      4. Pt strength will improve to 5/5 right LE  to allow pt increase ease with gait and full functional mobility with control   Eval: strength         Left Right   Hip Flexion 4/5 3 to 3-/5       ER 3+ 4-      Abduction 5 5      Adduction 5 5   Knee Flexion 5/5 4-/5      Extension 5/5 4/5   Ankle Plantarflexion 5 3+/5      Dorsiflexion 5 4+   Status at last note: knee ext: 4+/5,  knee flex: 4+/5, hip flex: 3-/5., ankle DF: 4+/5   Current: hip flex: 4 to 4+/5, knee flexion 4+ , knee extension 4+ to 5-/5 , DF 4 to 4+/5 progressing       5.  Pt ROM will improve with right approx = left knee  to allow smooth gait and navigation of stairs   Eval:  Knee Flexion 143/147 88/98      Extension 10 6 hyper   Status at last note: 113* AROM flexion   Current:  120 degrees flexion , 8 deg hyperextension progressing        PLAN  [x]  Upgrade activities as tolerated     [x]  Continue plan of care  []  Update interventions per flow sheet       []  Discharge due to:_  []  Other:_      Angelina Forbes, PT 4/19/2018  10:09 AM    Future Appointments  Date Time Provider Yanira Raygoza   4/19/2018 2:15 PM Janny Gambino MD [address]   4/24/2018 11:00 AM Rj Jorgensen, VIVIENNE ANDREW THE [facility]   4/26/2018 11:00 AM VIVIENNE Barnes THE [facility]

## 2018-04-24 ENCOUNTER — HOSPITAL ENCOUNTER (OUTPATIENT)
Dept: PHYSICAL THERAPY | Age: 63
Discharge: HOME | End: 2018-04-24
Payer: OTHER GOVERNMENT

## 2018-04-24 ENCOUNTER — APPOINTMENT (OUTPATIENT)
Dept: PHYSICAL THERAPY | Age: 63
End: 2018-04-24
Payer: OTHER GOVERNMENT

## 2018-04-24 PROCEDURE — 97016 VASOPNEUMATIC DEVICE THERAPY: CPT

## 2018-04-24 PROCEDURE — 97110 THERAPEUTIC EXERCISES: CPT

## 2018-04-24 PROCEDURE — 97112 NEUROMUSCULAR REEDUCATION: CPT

## 2018-04-24 NOTE — PROGRESS NOTES
PT DAILY TREATMENT NOTE     Patient Name: Diego Coffman  Date:2018  : 1955  [x]  Patient  Verified  Payor: LIVE / Plan: Marc Holland 74 / Product Type: Oneil Barney /    In time:1203  Out time:1248  Total Treatment Time (min): 45  Visit #: 17 of 24    Treatment Area: Right knee pain [M25.561]    SUBJECTIVE  Pain Level (0-10 scale): 0/10  Any medication changes, allergies to medications, adverse drug reactions, diagnosis change, or new procedure performed?: [x] No    [] Yes (see summary sheet for update)  Subjective functional status/changes:   [x] No changes reported      OBJECTIVE    Modality rationale: decrease edema, decrease inflammation and decrease pain to improve the patients ability to increase activity/position tolerance   Min Type Additional Details    [] Estim:  []Unatt       []IFC  []Premod                        []Other:  []w/ice   []w/heat  Position:  Location:    [] Estim: []Att    []TENS instruct  []NMES                    []Other:  []w/US   []w/ice   []w/heat  Position:  Location:    []  Traction: [] Cervical       []Lumbar                       [] Prone          []Supine                       []Intermittent   []Continuous Lbs:  [] before manual  [] after manual    []  Ultrasound: []Continuous   [] Pulsed                           []1MHz   []3MHz W/cm2:  Location:    []  Iontophoresis with dexamethasone         Location: [] Take home patch   [] In clinic    []  Ice     []  heat  []  Ice massage  []  Laser   []  Anodyne Position:  Location:    []  Laser with stim  []  Other:  Position:  Location:   10 [x]  Vasopneumatic Device Pressure:       [] lo [x] med [] hi   Temperature: [x] lo [] med [] hi   [] Skin assessment post-treatment:  []intact []redness- no adverse reaction    []redness  adverse reaction:      min []Eval                  []Re-Eval       25 min Therapeutic Exercise:  [x] See flow sheet :   Rationale: increase ROM and increase strength to improve the patients ability to normalize ADL's     min Therapeutic Activity:  []  See flow sheet :        10 min Neuromuscular Re-education:  []  See flow sheet :   Rationale: improve coordination, improve balance and increase proprioception  to improve the patients ability to normalize gait and function     min Manual Therapy:         min Gait Training:  ___ feet with ___ device on level surfaces with ___ level of assist   Rationale: With   [] TE   [] TA   [] neuro   [] other: Patient Education: [x] Review HEP    [] Progressed/Changed HEP based on:   [] positioning   [] body mechanics   [] transfers   [] heat/ice application    [] other:      Other Objective/Functional Measures: none taken today     Pain Level (0-10 scale) post treatment: 0/10    ASSESSMENT/Changes in Function: No new progress. Patient will continue to benefit from skilled PT services to modify and progress therapeutic interventions, address ROM deficits, address strength deficits, analyze and address soft tissue restrictions, analyze and cue movement patterns, analyze and modify body mechanics/ergonomics and assess and modify postural abnormalities to attain remaining goals. []  See Plan of Care  []  See progress note/recertification  []  See Discharge Summary         Progress towards goals / Updated goals:   Long Term Goals: To be accomplished in 6 weeks:  1.  Pt will be independant with HEP for continued and ongoing progression following discharge toward full functional mobility. Eval:pt started on HEP andgiven handout   Status at last note: progressing with independence  Current: pt reports compliance with HEP minimal cues needed with control and speed with exercises       2. Pt pain will improve >= 80% to allow pt return to PLOF. Eval:pain up to 9/10   Status at last note: average only 2-3/10 highest 4/10 able to sleep through the night, progressing  Current: NT      4.  Pt strength will improve to 5/5 right LE  to allow pt increase ease with gait and full functional mobility with control   Eval: strength         Left Right   Hip Flexion 4/5 3 to 3-/5       ER 3+ 4-      Abduction 5 5      Adduction 5 5   Knee Flexion 5/5 4-/5      Extension 5/5 4/5   Ankle Plantarflexion 5 3+/5      Dorsiflexion 5 4+   Status at last note: hip flex: 4 to 4+/5, knee flexion 4+ , knee extension 4+ to 5-/5 , DF 4 to 4+/5 progressing   Current: NT      5.  Pt ROM will improve with right approx = left knee  to allow smooth gait and navigation of stairs   Eval:  Knee Flexion 143/147 88/98      Extension 10 6 hyper   Status at last note:120 degrees flexion , 8 deg hyperextension progressing    Current: NT          PLAN  [x]  Upgrade activities as tolerated     [x]  Continue plan of care  []  Update interventions per flow sheet       []  Discharge due to:_  []  Other:_      Norma Alonso PT 4/24/2018  12:05 PM    Future Appointments  Date Time Provider Yanira Raygoza   4/26/2018 11:00 AM Norma Alonso PT [facility]   4/22/2019 10:00 AM Theodora Alarcon MD [address]

## 2018-04-26 ENCOUNTER — APPOINTMENT (OUTPATIENT)
Dept: PHYSICAL THERAPY | Age: 63
End: 2018-04-26
Payer: OTHER GOVERNMENT

## 2018-04-26 ENCOUNTER — HOSPITAL ENCOUNTER (OUTPATIENT)
Dept: PHYSICAL THERAPY | Age: 63
Discharge: HOME | End: 2018-04-26
Payer: OTHER GOVERNMENT

## 2018-04-26 PROCEDURE — 97110 THERAPEUTIC EXERCISES: CPT

## 2018-04-26 PROCEDURE — 97112 NEUROMUSCULAR REEDUCATION: CPT

## 2018-04-26 PROCEDURE — 97016 VASOPNEUMATIC DEVICE THERAPY: CPT

## 2018-04-26 NOTE — PROGRESS NOTES
PT DAILY TREATMENT NOTE     Patient Name: Beverlyn Schirmer  Date:2018  : 1955  [x]  Patient  Verified  Payor: LIVE / Plan: Marc Holland 74 / Product Type: Srini Snide /    In time:1100  Out time:1149  Total Treatment Time (min): 52  Visit #: 18 of 24    Treatment Area: Right knee pain [M25.561]    SUBJECTIVE  Pain Level (0-10 scale): 2/10  Any medication changes, allergies to medications, adverse drug reactions, diagnosis change, or new procedure performed?: [x] No    [] Yes (see summary sheet for update)  Subjective functional status/changes:   [x] No changes reported      OBJECTIVE    Modality rationale: decrease edema, decrease inflammation and decrease pain to improve the patients ability to increase activity/position tolerance   Min Type Additional Details    [] Estim:  []Unatt       []IFC  []Premod                        []Other:  []w/ice   []w/heat  Position:  Location:    [] Estim: []Att    []TENS instruct  []NMES                    []Other:  []w/US   []w/ice   []w/heat  Position:  Location:    []  Traction: [] Cervical       []Lumbar                       [] Prone          []Supine                       []Intermittent   []Continuous Lbs:  [] before manual  [] after manual    []  Ultrasound: []Continuous   [] Pulsed                           []1MHz   []3MHz W/cm2:  Location:    []  Iontophoresis with dexamethasone         Location: [] Take home patch   [] In clinic    []  Ice     []  heat  []  Ice massage  []  Laser   []  Anodyne Position:  Location:    []  Laser with stim  []  Other:  Position:  Location:   10 [x]  Vasopneumatic Device Pressure:       [] lo [x] med [] hi   Temperature: [x] lo [] med [] hi   [] Skin assessment post-treatment:  []intact []redness- no adverse reaction    []redness  adverse reaction:      min []Eval                  []Re-Eval       29 min Therapeutic Exercise:  [x] See flow sheet :   Rationale: increase ROM and increase strength to improve the patients ability to normalize ADL's     min Therapeutic Activity:  []  See flow sheet :        10 min Neuromuscular Re-education:  [x]  See flow sheet :   Rationale: improve coordination, improve balance and increase proprioception  to improve the patients ability to normalize gait and function. min Manual Therapy:          min Gait Training:  ___ feet with ___ device on level surfaces with ___ level of assist   Rationale: With   [] TE   [] TA   [] neuro   [] other: Patient Education: [x] Review HEP    [] Progressed/Changed HEP based on:   [] positioning   [] body mechanics   [] transfers   [] heat/ice application    [] other:      Other Objective/Functional Measures: none taken today     Pain Level (0-10 scale) post treatment: 0/10    ASSESSMENT/Changes in Function: No new progress. Patient will continue to benefit from skilled PT services to modify and progress therapeutic interventions, address ROM deficits, address strength deficits, analyze and address soft tissue restrictions, analyze and cue movement patterns, analyze and modify body mechanics/ergonomics and assess and modify postural abnormalities to attain remaining goals. []  See Plan of Care  []  See progress note/recertification  []  See Discharge Summary         Progress towards goals / Updated goals:  Long Term Goals: To be accomplished in 6 weeks:  1.  Pt will be independant with HEP for continued and ongoing progression following discharge toward full functional mobility. Eval:pt started on HEP andgiven handout   Status at last note: progressing with independence  Current: pt reports compliance with HEP minimal cues needed with control and speed with exercises       2. Pt pain will improve >= 80% to allow pt return to PLOF. Eval:pain up to 9/10   Status at last note: average only 2-3/10 highest 4/10 able to sleep through the night, progressing  Current: NT      4.  Pt strength will improve to 5/5 right LE  to allow pt increase ease with gait and full functional mobility with control   Eval: strength         Left Right   Hip Flexion 4/5 3 to 3-/5       ER 3+ 4-      Abduction 5 5      Adduction 5 5   Knee Flexion 5/5 4-/5      Extension 5/5 4/5   Ankle Plantarflexion 5 3+/5      Dorsiflexion 5 4+   Status at last note: hip flex: 4 to 4+/5, knee flexion 4+ , knee extension 4+ to 5-/5 , DF 4 to 4+/5 progressing   Current: NT      5.  Pt ROM will improve with right approx = left knee  to allow smooth gait and navigation of stairs   Eval:  Knee Flexion 143/147 88/98      Extension 10 6 hyper   Status at last note:120 degrees flexion , 8 deg hyperextension progressing    Current: NT          PLAN  []  Upgrade activities as tolerated     []  Continue plan of care  []  Update interventions per flow sheet       []  Discharge due to:_  []  Other:_      Eve Moran PT 4/26/2018  11:03 AM    Future Appointments  Date Time Provider Yanira Raygoza   5/1/2018 1:00 PM Eve Moran PT [facility]   5/3/2018 10:00 AM Eve Moran PT [facility]   5/7/2018 8:30 AM Eve Moran PT [facility]   5/9/2018 1:00 PM [facility]IVIENNE Car[facility]   4/22/2019 10:00 AM Flores Lerma MD [id number] [facility]

## 2018-05-01 ENCOUNTER — HOSPITAL ENCOUNTER (OUTPATIENT)
Dept: PHYSICAL THERAPY | Age: 63
Discharge: HOME | End: 2018-05-01
Payer: OTHER GOVERNMENT

## 2018-05-01 PROCEDURE — 97016 VASOPNEUMATIC DEVICE THERAPY: CPT

## 2018-05-01 PROCEDURE — 97112 NEUROMUSCULAR REEDUCATION: CPT

## 2018-05-01 PROCEDURE — 97110 THERAPEUTIC EXERCISES: CPT

## 2018-05-01 NOTE — PROGRESS NOTES
PT DAILY TREATMENT NOTE     Patient Name: Rajan Orozco  Date:2018  : 1955  [x]  Patient  Verified  Payor: LIVE / Plan: Marc Holland 74 / Product Type: Tae Campos /    In time:1251  Out time:141  Total Treatment Time (min): 51  Visit #: 19 of 24    Treatment Area: Right knee pain [M25.561]    SUBJECTIVE  Pain Level (0-10 scale): 0/10  Any medication changes, allergies to medications, adverse drug reactions, diagnosis change, or new procedure performed?: [x] No    [] Yes (see summary sheet for update)  Subjective functional status/changes:   [x] No changes reported      OBJECTIVE    Modality rationale: decrease edema, decrease inflammation and decrease pain to improve the patients ability to increase activity/position tolerance   Min Type Additional Details    [] Estim:  []Unatt       []IFC  []Premod                        []Other:  []w/ice   []w/heat  Position:  Location:    [] Estim: []Att    []TENS instruct  []NMES                    []Other:  []w/US   []w/ice   []w/heat  Position:  Location:    []  Traction: [] Cervical       []Lumbar                       [] Prone          []Supine                       []Intermittent   []Continuous Lbs:  [] before manual  [] after manual    []  Ultrasound: []Continuous   [] Pulsed                           []1MHz   []3MHz W/cm2:  Location:    []  Iontophoresis with dexamethasone         Location: [] Take home patch   [] In clinic    []  Ice     []  heat  []  Ice massage  []  Laser   []  Anodyne Position:  Location:    []  Laser with stim  []  Other:  Position:  Location:   10 [x]  Vasopneumatic Device Pressure:       [] lo [x] med [] hi   Temperature: [x] lo [] med [] hi   [] Skin assessment post-treatment:  []intact []redness- no adverse reaction    []redness  adverse reaction:      min []Eval                  []Re-Eval       31 min Therapeutic Exercise:  [x] See flow sheet :   Rationale: increase ROM and increase strength to improve the patients ability to normalize ADL's     min Therapeutic Activity:  []  See flow sheet :        10 min Neuromuscular Re-education:  [x]  See flow sheet :   Rationale: increase strength, improve balance and increase proprioception  to improve the patients ability to normalize gait and function     min Manual Therapy:          min Gait Training:  ___ feet with ___ device on level surfaces with ___ level of assist   Rationale: With   [] TE   [] TA   [] neuro   [] other: Patient Education: [x] Review HEP    [] Progressed/Changed HEP based on:   [] positioning   [] body mechanics   [] transfers   [] heat/ice application    [] other:      Other Objective/Functional Measures: see goal review     Pain Level (0-10 scale) post treatment: 0/10    ASSESSMENT/Changes in Function: No new progress. Patient will continue to benefit from skilled PT services to modify and progress therapeutic interventions, address ROM deficits, address strength deficits, analyze and address soft tissue restrictions, analyze and cue movement patterns, analyze and modify body mechanics/ergonomics and assess and modify postural abnormalities to attain remaining goals. []  See Plan of Care  []  See progress note/recertification  []  See Discharge Summary         Progress towards goals / Updated goals:  Long Term Goals: To be accomplished in 6 weeks:  1.  Pt will be independant with HEP for continued and ongoing progression following discharge toward full functional mobility. Eval:pt started on HEP andgiven handout   Status at last note: progressing with independence  Current: progressing with independence      2. Pt pain will improve >= 80% to allow pt return to PLOF. Eval:pain up to 9/10   Status at last note: average only 2-3/10 highest 4/10 able to sleep through the night, progressing  Current: no change      4.  Pt strength will improve to 5/5 right LE  to allow pt increase ease with gait and full functional mobility with control   Eval: strength         Left Right   Hip Flexion 4/5 3 to 3-/5       ER 3+ 4-      Abduction 5 5      Adduction 5 5   Knee Flexion 5/5 4-/5      Extension 5/5 4/5   Ankle Plantarflexion 5 3+/5      Dorsiflexion 5 4+   Status at last note: hip flex: 4 to 4+/5, knee flexion 4+ , knee extension 4+ to 5-/5 , DF 4 to 4+/5 progressing   Current: no change      5.  Pt ROM will improve with right approx = left knee  to allow smooth gait and navigation of stairs   Eval:  Knee Flexion 143/147 88/98      Extension 10 6 hyper   Status at last note:120 degrees flexion , 8 deg hyperextension progressing    Current: NT          PLAN  [x]  Upgrade activities as tolerated     [x]  Continue plan of care  []  Update interventions per flow sheet       []  Discharge due to:_  []  Other:_      Alison Hart, VIVIENNE 5/1/2018  12:50 PM    Future Appointments  Date Time Provider Yanira Raygoza   5/1/2018 1:00 PM VIVIENNE Julian [facility]   5/3/2018 10:00 AM Patsy Julian [facility]   5/7/2018 8:30 AM VIVIENNE Julian [facility]   5/9/2018 1:00 PM VIVIENNE Carrasco [facility]   4/22/2019 10:00 AM MD Edward Dumas

## 2018-05-03 ENCOUNTER — HOSPITAL ENCOUNTER (OUTPATIENT)
Dept: PHYSICAL THERAPY | Age: 63
Discharge: HOME | End: 2018-05-03
Payer: OTHER GOVERNMENT

## 2018-05-03 PROCEDURE — 97110 THERAPEUTIC EXERCISES: CPT

## 2018-05-03 PROCEDURE — 97016 VASOPNEUMATIC DEVICE THERAPY: CPT

## 2018-05-03 PROCEDURE — 97112 NEUROMUSCULAR REEDUCATION: CPT

## 2018-05-07 ENCOUNTER — HOSPITAL ENCOUNTER (OUTPATIENT)
Dept: PHYSICAL THERAPY | Age: 63
Discharge: HOME | End: 2018-05-07
Payer: OTHER GOVERNMENT

## 2018-05-07 PROCEDURE — 97016 VASOPNEUMATIC DEVICE THERAPY: CPT

## 2018-05-07 PROCEDURE — 97110 THERAPEUTIC EXERCISES: CPT

## 2018-05-07 PROCEDURE — 97112 NEUROMUSCULAR REEDUCATION: CPT

## 2018-05-07 NOTE — PROGRESS NOTES
PT DAILY TREATMENT NOTE     Patient Name: Clotilde Dooley  Date:2018  : 1955  [x]  Patient  Verified  Payor: LIVE / Plan: Marc Holland 74 / Product Type:  /    In time:815  Out time:903  Total Treatment Time (min): 48  Visit #:  of 24    Treatment Area: Right knee pain [M25.561]    SUBJECTIVE  Pain Level (0-10 scale): 0/10  Any medication changes, allergies to medications, adverse drug reactions, diagnosis change, or new procedure performed?: [x] No    [] Yes (see summary sheet for update)  Subjective functional status/changes:   [] No changes reported  Got in my bathtub 2 night in a row, which something I've been wanting to do since surgery.     OBJECTIVE    Modality rationale: decrease edema, decrease inflammation and decrease pain to improve the patients ability to increase activity/position tolerance   Min Type Additional Details    [] Estim:  []Unatt       []IFC  []Premod                        []Other:  []w/ice   []w/heat  Position:  Location:    [] Estim: []Att    []TENS instruct  []NMES                    []Other:  []w/US   []w/ice   []w/heat  Position:  Location:    []  Traction: [] Cervical       []Lumbar                       [] Prone          []Supine                       []Intermittent   []Continuous Lbs:  [] before manual  [] after manual    []  Ultrasound: []Continuous   [] Pulsed                           []1MHz   []3MHz W/cm2:  Location:    []  Iontophoresis with dexamethasone         Location: [] Take home patch   [] In clinic    []  Ice     []  heat  []  Ice massage  []  Laser   []  Anodyne Position:  Location:    []  Laser with stim  []  Other:  Position:  Location:   10 [x]  Vasopneumatic Device Pressure:       [] lo [x] med [] hi   Temperature: [x] lo [] med [] hi   [] Skin assessment post-treatment:  []intact []redness- no adverse reaction    []redness  adverse reaction:      min []Eval                  []Re-Eval       28 min Therapeutic Exercise:  [x] See flow sheet :   Rationale: increase ROM and increase strength to improve the patients ability to normalize ADL's     min Therapeutic Activity:  []  See flow sheet :        10 min Neuromuscular Re-education:  [x]  See flow sheet :   Rationale: increase strength and increase proprioception  to improve the patients ability to normalize gait and function     min Manual Therapy:          min Gait Training:  ___ feet with ___ device on level surfaces with ___ level of assist   Rationale: With   [] TE   [] TA   [] neuro   [] other: Patient Education: [x] Review HEP    [] Progressed/Changed HEP based on:   [] positioning   [] body mechanics   [] transfers   [] heat/ice application    [] other:      Other Objective/Functional Measures: see goal review     Pain Level (0-10 scale) post treatment: 0/10    ASSESSMENT/Changes in Function:        []  See Plan of Care  []  See progress note/recertification  [x]  See Discharge Summary         Progress towards goals / Updated goals:  Long Term Goals: To be accomplished in 6 weeks:  1.  Pt will be independant with HEP for continued and ongoing progression following discharge toward full functional mobility. Eval:pt started on HEP andgiven handout   Status at last note: progressing with independence  Current: MET: pt demonstrates independence     2. Pt pain will improve >= 80% to allow pt return to PLOF. Eval:pain up to 9/10   Status at last note: average only 2-3/10 highest 4/10 able to sleep through the night, progressing  Current: MET: 1-2/10 (other than nerve sensitivity pain in lower leg)      4.  Pt strength will improve to 5/5 right LE  to allow pt increase ease with gait and full functional mobility with control   Eval: strength         Left Right   Hip Flexion 4/5 3 to 3-/5       ER 3+ 4-      Abduction 5 5      Adduction 5 5   Knee Flexion 5/5 4-/5      Extension 5/5 4/5   Ankle Plantarflexion 5 3+/5      Dorsiflexion 5 4+   Status at last note: hip flex: 4 to 4+/5, knee flexion 4+ , knee extension 4+ to 5-/5 , DF 4 to 4+/5 progressing   Current: no change      5.  Pt ROM will improve with right approx = left knee  to allow smooth gait and navigation of stairs   Eval:  Knee Flexion 143/147 88/98      Extension 10 6 hyper   Status at last note:120 degrees flexion , 8 deg hyperextension progressing    Current: right knee AROM WFL/approaching WNL: 0-124             PLAN  []  Upgrade activities as tolerated     []  Continue plan of care  []  Update interventions per flow sheet       [x]  Discharge due to: completion of program with progression towards/meeting of goals  []  Other:_      Brianda Vargas PT 5/7/2018  8:12 AM    Future Appointments  Date Time Provider Yanira Raygoza   5/7/2018 8:30 AM Patsy Hess [facility]   5/9/2018 1:00 PM Brianda Vargas PT [facility]   4/22/2019 10:00 AM Tri Corona MD [address]

## 2018-05-07 NOTE — PROGRESS NOTES
[facility] at [address] [place] [address]  Phone: [phone]   Fax: [phone]    Discharge Summary    Patient name: Mary Baldwin     Start of Care: 3/5/18  Referral source: Davida Yepez MD    : 1955  Medical/Treatment Diagnosis: Right knee pain [M25.561]  Onset Date:2018 sx TKR   Prior Hospitalization: see medical history   Provider#: [id number]  Comorbidities:pre DM , arthritis neck , knees , right hip , previous fx arm and foot , gastric bypass sx , HBP    Prior Level of Function:pain with functional mobility but indep ambulated without assistive device   Medications: Verified on Patient Summary List    Visits from Start of Care: 21    Missed Visits: 0  Reporting Period : 18 to 18    [facility] be accomplished in 6 weeks:  1.  Pt will be independant with HEP for continued and ongoing progression following discharge toward full functional mobility. Eval:pt started on HEP andgiven handout   Status at last note: progressing with independence  Current: MET: pt demonstrates independence     2. Pt pain will improve >= 80% to allow pt return to PLOF. Eval:pain up to 9/10   Status at last note: average only 2-3/10 highest 4/10 able to sleep through the night, progressing  Current: MET: 1-2/10 (other than nerve sensitivity pain in lower leg)      4. Pt strength will improve to 5/5 right LE  to allow pt increase ease with gait and full functional mobility with control   Eval: strength         Left Right   Hip Flexion 4/5 3 to 3-/5       ER 3+ 4-      Abduction 5 5      Adduction 5 5   Knee Flexion 5/5 4-/5      Extension 5/5 4/5   Ankle Plantarflexion 5 3+/5      Dorsiflexion 5 4+   Status at last note: hip flex: 4 to 4+/5, knee flexion 4+ , knee extension 4+ to 5-/5 , DF 4 to 4+/5 progressing   Current: no change      5.  Pt ROM will improve with right approx = left knee  to allow smooth gait and navigation of stairs   Eval:  Knee Flexion 143/147 88/98      Extension 10 6 hyper   Status at last note:120 degrees flexion , 8 deg hyperextension progressing    Current: right knee AROM WFL/approaching WNL: 0-124            Assessment/ Summary of Care: Right knee AROM WFL/approaching WNL with strength and gait improved to normalize function. Pt ready to continue on own with self-monitored exercise.     RECOMMENDATIONS:  [x]Discontinue therapy: [x]Patient has reached or is progressing toward set goals      []Patient is non-compliant or has abdicated      []Due to lack of appreciable progress towards set goals    Mildred Greco, PT 5/7/2018 11:12 AM

## 2018-05-09 ENCOUNTER — APPOINTMENT (OUTPATIENT)
Dept: PHYSICAL THERAPY | Age: 63
End: 2018-05-09
Payer: OTHER GOVERNMENT

## 2019-06-28 PROBLEM — M67.431 GANGLION CYST OF DORSUM OF RIGHT WRIST: Status: ACTIVE | Noted: 2019-06-28

## 2020-07-28 ENCOUNTER — HOSPITAL ENCOUNTER (OUTPATIENT)
Dept: PHYSICAL THERAPY | Age: 65
Discharge: HOME | End: 2020-07-28
Payer: MEDICARE

## 2020-07-28 PROCEDURE — 97161 PT EVAL LOW COMPLEX 20 MIN: CPT | Performed by: PHYSICAL THERAPIST

## 2020-07-28 PROCEDURE — 97110 THERAPEUTIC EXERCISES: CPT | Performed by: PHYSICAL THERAPIST

## 2020-07-28 NOTE — PROGRESS NOTES
PT DAILY TREATMENT NOTE/CERVICAL [id number]    Patient Name: Ximena Lo  Date:2020  : 1955  [x]  Patient  Verified  Payor: Laura Felix / Plan: [place] [employer] PART [place] & B / Product Type: Medicare /    In time:10:10  Out time:11:10  Total Treatment Time (min): 60  Visit #: 1 of 8    Medicare/BCBS Only   Total Timed Codes (min):  30 1:1 Treatment Time:  30     Treatment Area: Right wrist pain [M25.531]    SUBJECTIVE  Pain Level (0-10 scale): 4  []constant []intermittent []improving []worsening []no change since onset    Any medication changes, allergies to medications, adverse drug reactions, diagnosis change, or new procedure performed?: [x] No    [] Yes (see summary sheet for update)  Subjective functional status/changes:     Patient has CC of right wrist/hand pain since 2019. Had surgery carpal tunnel release May 2020. SANDRA: Unremarkable Patient describes pain as stabbing, radiaing, heavy, intermittent . Pain is worse after in PM (evening). Reports numbness/tingling middle two fingers. Denies popping/clicking. Aggravating factors:grabbing, picking up, writing, sweeping. Alleviating factors: unloading, splint, weight, movement . Denies red flags: SOB, chest pain, dizziness/lightheadedness, blurred/double vision, HA, chills/fevers, night sweats, change in bowel/bladder control, abdominal pain, difficulty swallowing, slurred speech, unexplained weight gain/loss, nausea, vomiting. PMHx: unremarkable. Surgical Hx: right TKA (), gastric bypass 2002, . Social Hx: NA home, alcohol, tobacco, work status. PLOF: sedentary. CLOF: same.   Diagnostic Imaging: NA      OBJECTIVE/EXAMINATION    30 min []Eval                  []Re-Eval       30 min Therapeutic Exercise:  [] See flow sheet :   Rationale: increase ROM, increase strength and decrease pain to improve the patients ability to complete ADLs    With   [] TE   [] TA   [] neuro   [] other: Patient Education: [x] Review HEP    [] Progressed/Changed HEP based on:   [] positioning   [] body mechanics   [] transfers   [] heat/ice application    [] other:          Physical Therapy Evaluation Cervical Spine     OBJECTIVE  Posture: [] WNL  Head Position: forward  Shoulder/Scapular Position: rounded  C-Kyphosis:  [] increased   [] decreased   C-Lordosis:   [] increased   [] decreased  T-Kyphosis:  [] increased   [] decreased  T-Lordosis:   [] increased   [] decreased     Cervical Retraction: [] WNL    [] Abnormal:    Shoulder/Scapular Screen: [] WNL    [] Abnormal:    Active Movements: [] N/A   [] Too acute   [] Other:  ROM % AROM % PROM Comments:pain, area   Forward flexion 100     Extension 75     SB right 100     SB left  100     Rotation right 75     Rotation left 75       Thoracic Spine: [] N/A    [] WNL   [] Other:    Palpation:  [] Min  [] Mod  [] Severe    Location:  [] Min  [] Mod  [] Severe    Location:    UE Myotome:   [] Unable to assess at this time                                                                            L (1-5) R (1-5) Pain   C1 - Cerivcal flex   [] Yes   [] No   C2 - Cervical ext   [] Yes   [] No   C3 - Cervical SB   [] Yes   [] No   C4 - Shoulder shrug 5 5 [] Yes   [] No   C5 - Shoulder abd 5 5 [] Yes   [] No   C6 - Elbow Flex/wrist ext 5 4 [] Yes   [] No   C7 - Elbow Ext/wrist flex 4 4+ [] Yes   [] No   C8 -Thumb ext (thumbs up) 4- 4- [] Yes   [] No   T1 - Finger abduction 4- 4- [] Yes   [] No       Upper Limb Tension Tests: [] N/A       Ulnar: [] R    [] L    [] +    [] -       Median: [x] R    [] L    [x] +    [] -       Radial: [] R    [] L    [] +    [] -    Special Tests:  Cervical:        Vertebral Artery:  [] R    [] L    [] +    [] -       Alar Ligament: [] R    [] L    [] +    [] -       Transverse Lig: [] R    [] L    [] +    [] -       Spurling's:  [] R    [] L    [] +    [] -       Distraction:  [] R    [] L    [] +    [] -       Compression: [] R    [] L    [] +    [] -    Thoracic Outlet Tests: [] N/A       Adson's:  [] R    [] L    [] +    [] -       Hyperabduction: [] R    [] L    [] +    [] -       Gabriele's:  [] R    [] L    [] +    [] -       Jose:  [] R    [] L    [] +    [] -    Diaphragmatic Breathing: [] Normal    [] Abnormal      Other tests/comments: retractions increase pain       Pain Level (0-10 scale) post treatment: 0    ASSESSMENT/Changes in Function: Patient presents with signs/symptoms of right cervical radicular pain. Positive nerve tension tests. Pain reduced by right sided shoulder shrugs. Patient will continue to benefit from skilled PT services to modify and progress therapeutic interventions, address functional mobility deficits, address ROM deficits, address strength deficits, analyze and address soft tissue restrictions, analyze and cue movement patterns, analyze and modify body mechanics/ergonomics and assess and modify postural abnormalities to attain remaining goals.      [x]  See Plan of Care  []  See progress note/recertification  []  See Discharge Summary         Progress towards goals / Updated goals:  See POC    PLAN  []  Upgrade activities as tolerated     [x]  Continue plan of care  []  Update interventions per flow sheet       []  Discharge due to:_  []  Other:_      Nonddaniela Duncan, PT, DPT 7/28/2020  10:13 AM

## 2020-07-28 NOTE — PROGRESS NOTES
[facility] at [address] [place] [address] Drive: [phone]   Fax: [phone]  PLAN OF CARE / STATEMENT OF MEDICAL NECESSITY FOR PHYSICAL THERAPY SERVICES  Patient Name: Lolita He : 1955   Medical   Diagnosis: Right wrist pain [M25.531]  Right shoulder pain [M25.511] Treatment Diagnosis: Right wrist pain, right shoulder pain   Onset Date: 1 year     Referral Source: Juan Rubio MD [facility]): 2020   Prior Hospitalization: See medical history Provider #: [id number]   Prior Level of Function: Sedentary, independent w/ ADLs   Comorbidities: unremarkable   Medications: Verified on Patient Summary List   The Plan of Care and following information is based on the information from the initial evaluation.   ===========================================================================================  Assessment / key information:  Lolita He is a 76 y.o.  yo female presents with  signs/symptoms of right cervical radicular pain. Positive nerve tension tests. Pain reduced by right sided shoulder shrugs.      Patient will continue to benefit from skilled PT services to modify and progress therapeutic interventions, address functional mobility deficits, address ROM deficits, address strength deficits, analyze and address soft tissue restrictions, analyze and cue movement patterns, analyze and modify body mechanics/ergonomics and assess and modify postural abnormalities to attain remaining goals.       Pt instructed in HEP and will f/u in clinic for PT.  ===========================================================================================  Eval Complexity: History MEDIUM  Complexity : 1-2 comorbidities / personal factors will impact the outcome/ POC ;  Examination  MEDIUM Complexity : 3 Standardized tests and measures addressing body structure, function, activity limitation and / or participation in recreation ; Presentation LOW Complexity : Stable, uncomplicated ;  Decision Making MEDIUM Complexity : FOTO score of 26-74; Overall Complexity LOW   Problem List: pain affecting function, decrease ROM, decrease strength, edema affecting function, decrease activity tolerance and decrease flexibility/ joint mobility   Treatment Plan may include any combination of the following: Therapeutic exercise, Therapeutic activities, Neuromuscular re-education, Physical agent/modality, Manual therapy, Patient education and Self Care training  Patient / Family readiness to learn indicated by: asking questions, trying to perform skills and interest  Persons(s) to be included in education: patient (P)  Barriers to Learning/Limitations: no  Measures Taken: NA  Patient Goal (s): Decrease pain   Patient self reported health status: good  Rehabilitation Potential: good  Goals:  Short Term Goals: To be accomplished in 2 weeks:   Patient will report compliance with HEP 1x/day to aid in rehabilitation program.   Status at IE:NA   Current:Same as IE      Patient will increase cervical ROM to 100% in all planes to aid in completion of ADLs. Status at IE:75% grossly, 50% in cervical extension   Current: Same as IE    Long Term Goals: To be accomplished in 6 weeks:   Patient will increase B UE strength to 5/5 MMT throughout to aid in completion of ADLs. Status at IE:4/5 in right biceps   Current: Same as IE     Patient will report pain no greater than 1-2/10 throughout entire day to aid in completion of ADLs. Status at IE:4-6/10   Current: Same as IE     Patent will be able to increase right hand  strength to at least 45lbs to be able to return to full work duties. Status at IE:33lbs   Current: Same as IE     Patient will improve FOTO score to 63 points to demonstrate improvement in functional status.    Status at IE:49   Current: Same as IE   *FOTO score is an established functional score where 100 = no disability*    Frequency / Duration:   Patient to be seen 2  times per week for 6  weeks:  Patient / Caregiver education and instruction: self care and exercises      . Therapist Signature: Carlos Lay DPT, OCS Date: 0/74/2884   Certification Period: 7/28/2020 - 9/8/2020 Time: 11:43 AM   ===========================================================================================  I certify that the above Physical Therapy Services are being furnished while the patient is under my care. I agree with the treatment plan and certify that this therapy is necessary. Physician Signature:        Date:       Time:     Please sign and return to [facility] or you may fax the signed copy to [phone]. Thank you.

## 2020-07-30 ENCOUNTER — HOSPITAL ENCOUNTER (OUTPATIENT)
Dept: PHYSICAL THERAPY | Age: 65
Discharge: HOME | End: 2020-07-30
Payer: MEDICARE

## 2020-07-30 PROCEDURE — 97110 THERAPEUTIC EXERCISES: CPT | Performed by: PHYSICAL THERAPIST

## 2020-07-30 NOTE — PROGRESS NOTES
PT DAILY TREATMENT NOTE    Patient Name: Nerissa Marsk  Date:2020  : 1955  [x]  Patient  Verified  Payor: [place] MEDICARE / Plan: [place] MEDICARE PART A & B / Product Type: Medicare /    In time:1:30  Out time:2:25  Total Treatment Time (min): 55  Total Timed Codes (min): 55  1:1 Treatment Time ( [address] only): 55   Visit #: 2 of 8    Treatment Area: Right wrist pain [M25.531]  Right shoulder pain [M25.511]    SUBJECTIVE  Pain Level (0-10 scale): 4  Any medication changes, allergies to medications, adverse drug reactions, diagnosis change, or new procedure performed?: [x] No    [] Yes (see summary sheet for update)  Subjective functional status/changes:   [] No changes reported  Feels good. The shrug exercise has been helpful. OBJECTIVE    55 min Therapeutic Exercise:  [x] See flow sheet :   Rationale: increase ROM, increase strength and decrease pain to improve the patients ability to complete ADLs       With   [] TE   [] TA   [] neuro   [] other: Patient Education: [x] Review HEP    [] Progressed/Changed HEP based on:   [] positioning   [] body mechanics   [] transfers   [] heat/ice application    [] other:      Other Objective/Functional Measures: NA     Pain Level (0-10 scale) post treatment: 3    ASSESSMENT/Changes in Function: Patient responds well to treatment session. Patient is challenged with exercise as prescribed. Demonstrates low exercise capacity. Will progress as tolerated.  No adverse effects were noted from today's treatment session       Patient will continue to benefit from skilled PT services to modify and progress therapeutic interventions, address functional mobility deficits, address ROM deficits, address strength deficits, analyze and address soft tissue restrictions, analyze and cue movement patterns, analyze and modify body mechanics/ergonomics, assess and modify postural abnormalities    []  See Plan of Care  []  See progress note/recertification  []  See Discharge Summary Progress towards goals / Updated goals:  Short Term Goals: To be accomplished in 2 weeks:                   Patient will report compliance with HEP 1x/day to aid in rehabilitation program.                   Status at IE:NA                   Current:Reports compliance with HEP 7/30/2020                      Patient will increase cervical ROM to 100% in all planes to aid in completion of ADLs. Status at IE:75% grossly, 50% in cervical extension                   Current: Same as IE     Long Term Goals: To be accomplished in 6 weeks:                   Patient will increase B UE strength to 5/5 MMT throughout to aid in completion of ADLs. Status at IE:4/5 in right biceps                   Current: Same as IE                      Patient will report pain no greater than 1-2/10 throughout entire day to aid in completion of ADLs. Status at IE:4-6/10                   Current: Same as IE                      Patent will be able to increase right hand  strength to at least 45lbs to be able to return to full work duties. Status at IE:33lbs                   Current: Same as IE                      Patient will improve FOTO score to 63 points to demonstrate improvement in functional status.                    Status at IE:49                   Current: Same as IE              *FOTO score is an established functional score where 100 = no disability*    PLAN  []  Upgrade activities as tolerated     [x]  Continue plan of care  []  Update interventions per flow sheet       []  Discharge due to:_  []  Other:_      Gustabo Shoemaker PT, DPT 7/30/2020  1:44 PM    Future Appointments   Date Time Provider Yanira Raygoza   8/3/2020  1:45 PM Darryl Bernstein PT, DPT [facility]   8/6/2020  1:45 PM Darryl Bernstein PT, DPT [facility]   8/10/2020  1:45 PM Darryl Bernstein PT, DPT [facility]   8/13/2020  2:30 PM Darryl Bernstein PT, DPT [facility]   8/17/2020  2:30 PM Tania Granado PT, DPT [facility]   8/20/2020  8:45 AM Tania Granado PT, SLADE [facility]   10/5/2020 10:15 AM Ngozi Guerrero MD [facility] Mirella Molina

## 2020-08-03 ENCOUNTER — APPOINTMENT (OUTPATIENT)
Dept: PHYSICAL THERAPY | Age: 65
End: 2020-08-03
Payer: MEDICARE

## 2020-08-06 ENCOUNTER — APPOINTMENT (OUTPATIENT)
Dept: PHYSICAL THERAPY | Age: 65
End: 2020-08-06
Payer: MEDICARE

## 2020-08-10 ENCOUNTER — HOSPITAL ENCOUNTER (OUTPATIENT)
Dept: PHYSICAL THERAPY | Age: 65
Discharge: HOME | End: 2020-08-10
Payer: MEDICARE

## 2020-08-10 PROCEDURE — 97110 THERAPEUTIC EXERCISES: CPT | Performed by: PHYSICAL THERAPIST

## 2020-08-17 ENCOUNTER — HOSPITAL ENCOUNTER (OUTPATIENT)
Dept: PHYSICAL THERAPY | Age: 65
Discharge: HOME | End: 2020-08-17
Payer: MEDICARE

## 2020-08-17 PROCEDURE — 97110 THERAPEUTIC EXERCISES: CPT | Performed by: PHYSICAL THERAPIST

## 2020-08-20 ENCOUNTER — HOSPITAL ENCOUNTER (OUTPATIENT)
Dept: PHYSICAL THERAPY | Age: 65
Discharge: HOME | End: 2020-08-20
Payer: MEDICARE

## 2020-08-20 PROCEDURE — 97110 THERAPEUTIC EXERCISES: CPT | Performed by: PHYSICAL THERAPIST

## 2020-08-20 NOTE — PROGRESS NOTES
PT DAILY TREATMENT NOTE    Patient Name: Reid Tolliver  Date:2020  : 1955  [x]  Patient  Verified  Payor: Brianna Shannon / Plan: [place] MEDICARE PART A & B / Product Type: Medicare /    In time:8:45  Out time:9:30  Total Treatment Time (min): 45  Total Timed Codes (min): 45  1:1 Treatment Time ( [address] only): 45   Visit #: 5 of 8    Treatment Area: Right wrist pain [M25.531]  Right shoulder pain [M25.511]    SUBJECTIVE  Pain Level (0-10 scale): 3  Any medication changes, allergies to medications, adverse drug reactions, diagnosis change, or new procedure performed?: [x] No    [] Yes (see summary sheet for update)  Subjective functional status/changes:   [] No changes reported  The sensitivity seems a bit better over the past few days. OBJECTIVE    45 min Therapeutic Exercise:  [x] See flow sheet :   Rationale: increase ROM, increase strength and decrease pain to improve the patients ability to complete ADLs       With   [] TE   [] TA   [] neuro   [] other: Patient Education: [x] Review HEP    [] Progressed/Changed HEP based on:   [] positioning   [] body mechanics   [] transfers   [] heat/ice application    [] other:      Other Objective/Functional Measures: NA     Pain Level (0-10 scale) post treatment: 1    ASSESSMENT/Changes in Function: Patient responds well to treatment session. Patient is progressing well. Minimal difficulty with exercises as prescribed.  . No adverse effects were noted from today's treatment session    Patient will continue to benefit from skilled PT services to modify and progress therapeutic interventions, address functional mobility deficits, address ROM deficits, address strength deficits, analyze and address soft tissue restrictions, analyze and cue movement patterns, analyze and modify body mechanics/ergonomics, assess and modify postural abnormalities    []  See Plan of Care  []  See progress note/recertification  []  See Discharge Summary         Progress towards goals / Updated goals:  Short Term Goals: To be accomplished in 2 weeks:                   XIMENA will report compliance with HEP 1x/day to aid in rehabilitation program.                   Status at IE:NA                   Current:Reports compliance with HEP 7/30/2020                      Patient will increase cervical ROM to 100% in all planes to aid in completion of ADLs.                  Status at IE:75% grossly, 50% in cervical extension                   Current: progressing 75% grossly 8/20/2020     Long Term Goals: To be accomplished in 6 weeks:                   Patient will increase B UE strength to 5/5 MMT throughout to aid in completion of ADLs.                  Status at IE:4/5 in right biceps                   Current: Same as IE                      Patient will report pain no greater than 1-2/10 throughout entire day to aid in completion of ADLs.                  Status at IE:4-6/10                   Current: Same as IE                      Patent will be able to increase right hand  strength to at least 45lbs to be able to return to full work duties.                  Status at IE:33lbs                   Current: Same as IE                      Patient will improve FOTO score to 63 points to demonstrate improvement in functional status.                    Status at IE:49                   Current: Same as IE              *FOTO score is an established functional score where 100 = no disability*    PLAN  []  Upgrade activities as tolerated     [x]  Continue plan of care  []  Update interventions per flow sheet       []  Discharge due to:_  []  Other:_      Vijaya Steven, PT, DPT 8/20/2020  9:21 AM    Future Appointments   Date Time Provider Yanira Raygoza   10/5/2020 10:15 AM Norris Sutton MD [facility]

## 2020-08-26 ENCOUNTER — HOSPITAL ENCOUNTER (OUTPATIENT)
Dept: PHYSICAL THERAPY | Age: 65
Discharge: HOME | End: 2020-08-26
Payer: MEDICARE

## 2020-08-26 PROCEDURE — 97110 THERAPEUTIC EXERCISES: CPT | Performed by: PHYSICAL THERAPIST

## 2020-08-26 NOTE — PROGRESS NOTES
PT DAILY TREATMENT NOTE    Patient Name: Katelin Mays  Date:2020  : 1955  [x]  Patient  Verified  Payor: Saima  / Plan: [place] MEDICARE PART A & B / Product Type: Medicare /    In time:4:00  Out time:4:40  Total Treatment Time (min): 40  Total Timed Codes (min): 40  1:1 Treatment Time ( only): 40   Visit #: 6 of 8    Treatment Area: Right wrist pain [M25.531]  Right shoulder pain [M25.511]    SUBJECTIVE  Pain Level (0-10 scale): 3  Any medication changes, allergies to medications, adverse drug reactions, diagnosis change, or new procedure performed?: [x] No    [] Yes (see summary sheet for update)  Subjective functional status/changes:   [] No changes reported  Feels     OBJECTIVE    40 min Therapeutic Exercise:  [x] See flow sheet :   Rationale: increase ROM, increase strength and decrease pain to improve the patients ability to complete ADLs         With   [] TE   [] TA   [] neuro   [] other: Patient Education: [x] Review HEP    [] Progressed/Changed HEP based on:   [] positioning   [] body mechanics   [] transfers   [] heat/ice application    [] other:      Other Objective/Functional Measures: NA     Pain Level (0-10 scale) post treatment: 3    ASSESSMENT/Changes in Function: Patient responds well to treatment session. Patient is progressing well. Will attempt to increase weights next session. Increased mirror therapy to include movement of both arms today. This reportedly increased the pain in the right arm.  . No adverse effects were noted from today's treatment session       Patient will continue to benefit from skilled PT services to modify and progress therapeutic interventions, address functional mobility deficits, address ROM deficits, address strength deficits, analyze and address soft tissue restrictions, analyze and cue movement patterns, analyze and modify body mechanics/ergonomics, assess and modify postural abnormalities    []  See Plan of Care  []  See progress note/recertification  []  See Discharge Summary         Progress towards goals / Updated goals:  Short Term Goals: To be accomplished in 2 weeks:                   [facility] will report compliance with HEP 1x/day to aid in rehabilitation program.                   Status at IE:NA                   Current:Reports compliance with HEP 7/30/2020                      Patient will increase cervical ROM to 100% in all planes to aid in completion of ADLs.                  Status at IE:75% grossly, 50% in cervical extension                   Current: progressing 75% grossly 8/20/2020     Long Term Goals: To be accomplished in 6 weeks:                   Patient will increase B UE strength to 5/5 MMT throughout to aid in completion of ADLs.                  Status at IE:4/5 in right biceps                   Current: Same as IE                      Patient will report pain no greater than 1-2/10 throughout entire day to aid in completion of ADLs.                  Status at IE:4-6/10                   Current: Same as IE                      Patent will be able to increase right hand  strength to at least 45lbs to be able to return to full work duties.                  Status at IE:33lbs                   Current: Same as IE                      Patient will improve FOTO score to 63 points to demonstrate improvement in functional status.                    Status at IE:49                   Current: Same as IE              *FOTO score is an established functional score where 100 = no disability*    PLAN  []  Upgrade activities as tolerated     [x]  Continue plan of care  []  Update interventions per flow sheet       []  Discharge due to:_  []  Other:_      Lenny Rudd PT, DPT 8/26/2020  4:06 PM    Future Appointments   Date Time Provider Yanira Raygoza   8/28/2020 10:15 AM Feliciano Berumen PT, DPT [facility]   9/1/2020 10:15 AM Feliciano Berumen PT, DPT [facility]   10/5/2020 10:15 AM Magdiel Olmedo MD [facility]

## 2020-08-28 ENCOUNTER — HOSPITAL ENCOUNTER (OUTPATIENT)
Dept: PHYSICAL THERAPY | Age: 65
Discharge: HOME | End: 2020-08-28
Payer: MEDICARE

## 2020-08-28 PROCEDURE — 97110 THERAPEUTIC EXERCISES: CPT | Performed by: PHYSICAL THERAPIST

## 2020-08-28 NOTE — PROGRESS NOTES
[facility] at [address] [place] [address] Drive: [phone]   Fax: [phone]  Discharge Summary  Patient Name: Marcelo Anne : 1955   Medical   Diagnosis: Right wrist pain [M25.531]  Right shoulder pain [M25.511] Treatment Diagnosis: Right shoulder pain, right wrist pain   Onset Date: 1 year     Referral Source: Blanca Lara MD Start of Care [facility]): 2002   Prior Hospitalization: See medical history Provider #: [id number]   Prior Level of Function: Sedentary, independent w/ ADLs   Comorbidities: OA, DMII   Medications: Verified on Patient Summary List      ===========================================================================================  Assessment / Summary of Care:  Marcelo Anne is a 76 y.o.  yo female with right sided cervical radicular symptoms (pain, weakness). Patient has made good progress to date. Pain is resolved. Has improved her strength. Is capable of progressing to independent HEP at this time. No adverse effects were noted from today's treatment session       ===========================================================================================    Plan: Discharge to independent HEP. Goals:   Short Term Goals: To be accomplished in 2 weeks:                   NVCC[facility] will report compliance with HEP 1x/day to aid in rehabilitation program.                   Status at IE:NA                   Current:Reports compliance with HEP 2020                      Patient will increase cervical ROM to 100% in all planes to aid in completion of ADLs.                  Status at IE:75% grossly, 50% in cervical extension                   Current: progressing 75% grossly 2020     Long Term Goals: To be accomplished in 6 weeks:                   Patient will increase B UE strength to 5/5 MMT throughout to aid in completion of ADLs.                    Status at IE:4/5 in right biceps                   Current:MMT 4+/5, not met 8/28/2020                      Patient will report pain no greater than 1-2/10 throughout entire day to aid in completion of ADLs.                  Status at IE:4-6/10                   Current: 0/10, Met 8/28/2020                      Patent will be able to increase right hand  strength to at least 45lbs to be able to return to full work duties.                  Status at IE:33lbs                   Current: 40lbs, not met 8/28/2020                      Patient will improve FOTO score to 63 points to demonstrate improvement in functional status.                  Status at IE:49                   Current: 62 not met 8/28/2020              *FOTO score is an established functional score where 100 = no disability*    ===========================================================================================  Subjective: Feels good. No pain. Feels ready to be discharged.        Objective: hand  strength 40lbs, right elbow flexion 4+/5    Therapist Signature: Evelia Hernandez PT, DPT, OCS Date: 8/28/2020     Time: 11:01 AM

## 2020-08-28 NOTE — PROGRESS NOTES
PT DAILY TREATMENT NOTE    Patient Name: Jayne Colvin  Date:2020  : 1955  [x]  Patient  Verified  Payor: [place] MEDICARE / Plan: VA MEDICARE PART A & B / Product Type: Medicare /    In time:10:15  Out time:10:54  Total Treatment Time (min): 39  Total Timed Codes (min): 39  1:1 Treatment Time ([facility] only): 44   Visit #: 7 of 8    Treatment Area: Right wrist pain [M25.531]  Right shoulder pain [M25.511]    SUBJECTIVE  Pain Level (0-10 scale): 0  Any medication changes, allergies to medications, adverse drug reactions, diagnosis change, or new procedure performed?: [x] No    [] Yes (see summary sheet for update)  Subjective functional status/changes:   [] No changes reported  Feels good. No pain. Feels ready to be discharged. OBJECTIVE    39 min Therapeutic Exercise:  [x] See flow sheet :   Rationale: increase ROM, increase strength and decrease pain to improve the patients ability to complete ADLs       With   [] TE   [] TA   [] neuro   [] other: Patient Education: [x] Review HEP    [] Progressed/Changed HEP based on:   [] positioning   [] body mechanics   [] transfers   [] heat/ice application    [] other:      Other Objective/Functional Measures: NA     Pain Level (0-10 scale) post treatment: 0    ASSESSMENT/Changes in Function: Patient responds well to treatment session. Patient has made good progress to date. Pain is resolved. Has improved her strength. Is capable of progressing to independent HEP at this time.  . No adverse effects were noted from today's treatment session    []  See Plan of Care  []  See progress note/recertification  [x]  See Discharge Summary         Progress towards goals / Updated goals:  Short Term Goals: To be accomplished in 2 weeks:                   IYJPFDX will report compliance with HEP 1x/day to aid in rehabilitation program.                   Status at IE:NA                   Current:Reports compliance with HEP 2020                      Patient will increase cervical ROM to 100% in all planes to aid in completion of ADLs.                  Status at IE:75% grossly, 50% in cervical extension                   Current: progressing 75% grossly 8/20/2020     Long Term Goals: To be accomplished in 6 weeks:                   Patient will increase B UE strength to 5/5 MMT throughout to aid in completion of ADLs.                  Status at IE:4/5 in right biceps                   Current:MMT 4+/5, not met 8/28/2020                      Patient will report pain no greater than 1-2/10 throughout entire day to aid in completion of ADLs.                  Status at IE:4-6/10                   Current: 0/10, Met 8/28/2020                      Patent will be able to increase right hand  strength to at least 45lbs to be able to return to full work duties.                  Status at IE:33lbs                   Current: 40lbs, not met 8/28/2020                      Patient will improve FOTO score to 63 points to demonstrate improvement in functional status.                    Status at IE:49                   Current: Same as IE              *FOTO score is an established functional score where 100 = no disability*    PLAN  []  Upgrade activities as tolerated     [x]  Continue plan of care  []  Update interventions per flow sheet       []  Discharge due to:_  []  Other:_      Molly Rossi, PT, DPT 8/28/2020  10:16 AM    Future Appointments   Date Time Provider Yanira Raygoza   9/1/2020  4:00 PM Olesya Villalba, DPT [facility]   10/5/2020 10:15 AM Frieda Serrano MD [facility]

## 2020-09-01 ENCOUNTER — APPOINTMENT (OUTPATIENT)
Dept: PHYSICAL THERAPY | Age: 65
End: 2020-09-01

## 2021-02-22 ENCOUNTER — HOSPITAL ENCOUNTER (OUTPATIENT)
Dept: PHYSICAL THERAPY | Age: 66
Discharge: HOME | End: 2021-02-22
Payer: MEDICARE

## 2021-02-22 PROCEDURE — 97162 PT EVAL MOD COMPLEX 30 MIN: CPT | Performed by: PHYSICAL THERAPIST

## 2021-02-22 PROCEDURE — 97530 THERAPEUTIC ACTIVITIES: CPT | Performed by: PHYSICAL THERAPIST

## 2021-02-22 NOTE — PROGRESS NOTES
PT DAILY TREATMENT NOTE    Patient Name: Ky Outhouse  Date:2021  : 1955  [x]  Patient  Verified  Payor: Noy Blackwood / Plan: [place] / Product Type: Medicare /    In NAXR:5347  Out time:0940  Total Treatment Time (min): 47  Total Timed Codes (min): 12  1:1 Treatment Time ( only): 47   Visit #: 1 of 12    Treatment Area: Low back pain [M54.5]    SUBJECTIVE  Pain Level (0-10 scale): 6 throbbing , if touch it sore , buttock , and in thigh pulling pain at times down to ankle   Pt received injection on Friday which has helped some . Any medication changes, allergies to medications, adverse drug reactions, diagnosis change, or new procedure performed?: [x] No    [] Yes (see summary sheet for update)  Subjective functional status/changes:   [] No changes reported  Hx of right hip pain arthritis >5 years and knee replacement right knee ,  morning pt woke up with pain in early morning in left buttock , got up moved around alittle bit walked out on porch , went to step back in small step and fell into house onto knees , unable to step over threshold due to pain left buttock . Difficulty walking and sleeping , still needs to use hands to help pull leg up and cross legs . Pt fell a second time end of Dec went to get into  side full weight on left leg and gave out fell to ground .  Can get up and down steps one step at time and holds 2 rails ,     See paper eval for full report     OBJECTIVE        35 min [x]Eval                  []Re-Eval       12 min Therapeutic Activity:  -  See flow sheet : started begin ex bridges and Single knee and double knee to chest . Discussed findings of eval .    Rationale: increase ROM and increase strength  to improve the patients functional mobility and activity tolerance return to PLOF            With   [] TE   [] TA   [] neuro   [] other: Patient Education: [x] Review HEP    [] Progressed/Changed HEP based on:   [x] positioning   [x] body mechanics   [x] transfers   [] heat/ice application    [] other:      Other Objective/Functional Measures: FOTO: 47/100 see paper eval for full report      Pain Level (0-10 scale) post treatment: 5    ASSESSMENT/Changes in Function: Patient is a 76 y. o.female who presents to PT with Low back pain [M54.5]. Left buttock pain with pull down post left thigh and occasional shooting pain down to ankle. Intial directional preference into flexion. No deviation , no nerve tension , less pain with knees to chest. Pt with TTP over PSIS, low back and sacrum, + LE weakness L>R. Mild tight HS , very tight hip flexors and quads, no change in sensation to light touch. The patient will benefit from skilled PT services to address these deficits and facilitate return to premorbid activity level and improved quality of life. Patient will continue to benefit from skilled PT services to modify and progress therapeutic interventions, address functional mobility deficits, address ROM deficits, address strength deficits, analyze and address soft tissue restrictions, analyze and cue movement patterns, analyze and modify body mechanics/ergonomics, assess and modify postural abnormalities, address imbalance/dizziness and instruct in home and community integration to attain remaining goals. [x]  See Plan of Care  []  See progress note/recertification  []  See Discharge Summary         Progress towards goals / Updated goals:  Short Term Goals: STG- To be accomplished in 4 week(s):  1. Pt will be compliant with HEP for max progression toward all goals and return to PLOF. Eval:started on HEP double knees to chest , single knee chest , bridge     2. Pt pain will improve >= 40% to allow pt improved sleep and tolerance to daily activity. Eval:10+ per pt max pain     3. Pt FOTO score will increase by >=7 points to show improvement in functional mobility.    Eval:47/100  *will reasses every 5th visit     Long Term Goals: LTG- To be accomplished in 8 week(s):    1. Pt will be independant with HEP for continued and ongoing progression following discharge toward full functional mobility. Eval:started on HEP     2.Pt pain will improve >= 80% to allow pt return to PLOF. Eval:10+ max pain    3. Pt FOTO score will increase by >=15 points to show improvement in functional mobility. Eval:47/100    will reassess every 5th visit     4. Pt strength will improve to >=4+ in core and bilateral LE to allow pt to return to PLOF , improve walking tolerance and control   Eval:Hip flexion 3+/5 bilaterally , quad right 5/5 , left 3+/5, HS 4+/5 right , 4-/5 left DF ankle and toe extension 5/5     5. Pt ROM will improve to WNL trunk mobility  to allow pt to return to PLOF full functional mobility and increase ease with childcare. Eval:pressups 50%, standing extension 7 deg , fingers to mid shin flexion , trunk rotation right close to 75% left 25%    6. Pt will be able to ambulate up and down stairs with 1 rail reciprocal step over step. Eval: 1 step at time with 1 rail       PLAN  [x]  Upgrade activities as tolerated     [x]  Continue plan of care  []  Update interventions per flow sheet       []  Discharge due to:_  []  Other:_      Paulina Parsons, PT 2/22/2021  8:54 AM    No future appointments.

## 2021-02-22 NOTE — PROGRESS NOTES
[facility] at [address] [place] [address]  Phone: [phone]   Fax: [phone]    Plan of Care/ Statement of Necessity for Physical Therapy Services     Patient name: Marco Antonio Bill Start of Care: 2021   Referral source: Velasquez Ring DO : 1955    Medical Diagnosis: Low back pain [M54.5]  Sciatica, left side [M54.32]   Onset Date:Dec 25, 2020    Treatment Diagnosis: low back pain left side sciatica   Prior Hospitalization: see medical history Provider#: [id number]   Medications: Verified on Patient summary List    Comorbidities: arthritis , high blood pressure , hx of TKR , hx of back neck shoulder pain with fx in right shoulder    Prior Level of Function: pt able to bend , lift and walk ,navigate stairs, drive and travel and take care of 4 and 6 y/o children without problems . The Plan of Care and following information is based on the information from the initial evaluation. Assessment/ key information: Patient is a 76 y. o.female who presents to PT with Low back pain [M54.5]. Left buttock pain with pull down post left thigh and occasional shooting pain down to ankle. Intial directional preference into flexion. No deviation , no nerve tension , less pain with knees to chest. Pt with TTP over PSIS, low back and sacrum, + LE weakness L>R. Mild tight HS , very tight hip flexors and quads, no change in sensation to light touch. The patient will benefit from skilled PT services to address these deficits and facilitate return to premorbid activity level and improved quality of life. Evaluation Complexity History HIGH Complexity :3+ comorbidities / personal factors will impact the outcome/ POC ; Examination HIGH Complexity : 4+ Standardized tests and measures addressing body structure, function, activity limitation and / or participation in recreation  ;Presentation MEDIUM Complexity : Evolving with changing characteristics  ; Clinical Decision Making MEDIUM Complexity : FOTO score of 26-74  Overall Complexity Rating: MEDIUM  Problem List: pain affecting function, decrease ROM, decrease strength, impaired gait/ balance, decrease ADL/ functional abilitiies, decrease activity tolerance, decrease flexibility/ joint mobility and decrease transfer abilities   Treatment Plan may include any combination of the following: Therapeutic exercise, Therapeutic activities, Neuromuscular re-education, Physical agent/modality, Gait/balance training, Manual therapy, Aquatic therapy, Patient education, Self Care training, Functional mobility training, Home safety training and Stair training  Patient / Family readiness to learn indicated by: asking questions, trying to perform skills and interest  Persons(s) to be included in education: patient (P)  Barriers to Learning/Limitations: None  Measures taken if barriers to learning:   Patient Goal (s): no more pain  Patient Self Reported Health Status: good  Rehabilitation Potential: good  Short Term Goals: STG- To be accomplished in 4 week(s):  1. Pt will be compliant with HEP for max progression toward all goals and return to PLOF. Eval:started on HEP double knees to chest , single knee chest , bridge      2. Pt pain will improve >= 40% to allow pt improved sleep and tolerance to daily activity. Eval:10+ per pt max pain      3. Pt FOTO score will increase by >=7 points to show improvement in functional mobility. Eval:47/100  *will reasses every 5th visit      Long Term Goals: LTG- To be accomplished in 8 week(s):     1. Pt will be independant with HEP for continued and ongoing progression following discharge toward full functional mobility. Eval:started on HEP      2. Pt pain will improve >= 80% to allow pt return to PLOF. Eval:10+ max pain     3. Pt FOTO score will increase by >=15 points to show improvement in functional mobility. Eval:47/100    will reassess every 5th visit      4.  Pt strength will improve to >=4+ in core and bilateral LE to allow pt to return to PLOF , improve walking tolerance and control   Eval:Hip flexion 3+/5 bilaterally , quad right 5/5 , left 3+/5, HS 4+/5 right , 4-/5 left DF ankle and toe extension 5/5      5. Pt ROM will improve to WNL trunk mobility  to allow pt to return to PLOF full functional mobility and increase ease with childcare. Eval:pressups 50%, standing extension 7 deg , fingers to mid shin flexion , trunk rotation right close to 75% left 25%     6.  Pt will be able to ambulate up and down stairs with 1 rail reciprocal step over step. Eval: 1 step at time with 1 rail    Frequency / Duration: Patient to be seen 2 times per week for 8 weeks. Patient/ Caregiver education and instruction: Diagnosis, prognosis, self care, activity modification and exercises   [x]  Plan of care has been reviewed with PTA    Certification Period: 2/22/2021- 4/21/2021    Menifee Ion, PT 2/22/2021 12:36 PM  _____________________________________________________________________  I certify that the above Therapy Services are being furnished while the patient is under my care. I agree with the treatment plan and certify that this therapy is necessary.     [de-identified] Signature:____________Date:_________TIME:________                                      Keven Patel DO    ** Signature, Date and Time must be completed for valid certification **    Please sign and return to [facility] at [address]  Phone: [phone]   Fax: [phone]

## 2021-02-25 ENCOUNTER — HOSPITAL ENCOUNTER (OUTPATIENT)
Dept: PHYSICAL THERAPY | Age: 66
Discharge: HOME | End: 2021-02-25
Payer: MEDICARE

## 2021-02-25 PROCEDURE — 97110 THERAPEUTIC EXERCISES: CPT | Performed by: PHYSICAL THERAPIST

## 2021-02-25 NOTE — PROGRESS NOTES
PT DAILY TREATMENT NOTE    Patient Name: Effie Sam  Date:2021  : 1955  [x]  Patient  Verified  Payor: Stacia Lopez / Plan: [place] MED[place]ARE PART A & B / Product Type: Medicare /    In time:1515  Out time:1605  Total Treatment Time (min): 50  Total Timed Codes (min): 50  1:1 Treatment Time (MC only): 50   Visit #: 2 of 16    Treatment Area: Low back pain [M54.5]  Sciatica, left side [M54.32]    SUBJECTIVE  Pain Level (0-10 scale): 5  Any medication changes, allergies to medications, adverse drug reactions, diagnosis change, or new procedure performed?: [x] No    [] Yes (see summary sheet for update)  Subjective functional status/changes:   [] No changes reported  Pt states with nicer weather was able to go for a walk last 2 days , walked slowly for 1/4 mile each day     OBJECTIVE      50 min Therapeutic Exercise:  [x] See flow sheet :   Rationale: increase ROM, increase strength and improve coordination to improve the patients ability to perform ADL's, walk and take care of/playwith  young children           With   [] TE   [] TA   [] neuro   [] other: Patient Education: [x] Review HEP    [] Progressed/Changed HEP based on:   [] positioning   [] body mechanics   [] transfers   [] heat/ice application    [] other:      Other Objective/Functional Measures:   SLS 1-3 sec left without hands , up to 12 sec with 2 finger hold     SLS right 1-2 sec without hands up to 6 sec with 2 finger hold     Pain Level (0-10 scale) post treatment: 0 in back , 5 hips     ASSESSMENT/Changes in Function: pt tolerated beginning ex , given handout for HEP , no adverse affect with therapy , assisted with knee to chest for light end range stretch pt notes knee to chest feels best for low back.      Patient will continue to benefit from skilled PT services to modify and progress therapeutic interventions, address functional mobility deficits, address ROM deficits, address strength deficits, analyze and address soft tissue restrictions, analyze and cue movement patterns, analyze and modify body mechanics/ergonomics and assess and modify postural abnormalities to attain remaining goals. [x]  See Plan of Care  []  See progress note/recertification  []  See Discharge Summary         Progress towards goals / Updated goals:  Short Term Goals: STG- To be accomplished in 4 week(s):  1.  Pt will be compliant with HEP for max progression toward all goals and return to PLOF. Eval:started on HEP double knees to chest , single knee chest , bridge   Current : pt reported tried HEP no c/o , reviewed ex today 2/25/2021     2. Pt pain will improve >= 40% to allow pt improved sleep and tolerance to daily activity. Eval:10+ per pt max pain  Current : 6/10 in last 2-3 days only 2/25/2021       3. Pt FOTO score will increase by >=7 points to show improvement in functional mobility. Eval:47/100  *will reasses every 5th visit      Long Term Goals: LTG- To be accomplished in 8 week(s):     1.  Pt will be independant with HEP for continued and ongoing progression following discharge toward full functional mobility. Eval:started on HEP      2. Pt pain will improve >= 80% to allow pt return to PLOF. Eval:10+ max pain     3. Pt FOTO score will increase by >=15 points to show improvement in functional mobility. Eval:47/100    will reassess every 5th visit      4. Pt strength will improve to >=4+ in core and bilateral LE to allow pt to return to PLOF , improve walking tolerance and control   Eval:Hip flexion 3+/5 bilaterally , quad right 5/5 , left 3+/5, HS 4+/5 right , 4-/5 left DF ankle and toe extension 5/5      5.  Pt ROM will improve to WNL trunk mobility  to allow pt to return to PLOF full functional mobility and increase ease with childcare.   Eval:pressups 50%, standing extension 7 deg , fingers to mid shin flexion , trunk rotation right close to 75% left 25%     6.  Pt will be able to ambulate up and down stairs with 1 rail reciprocal step over step.   Eval: 1 step at time with 1 rail     PLAN  [x]  Upgrade activities as tolerated     [x]  Continue plan of care  []  Update interventions per flow sheet       []  Discharge due to:_  []  Other:_      Paulina Parsons, PT 2/25/2021  3:20 PM    Future Appointments   Date Time Provider Yanira Raygoza   3/3/2021  2:30 PM Jorge Promise, PT [facility]   3/4/2021  1:45 PM Bree Mccarthy, PT [facility]   3/9/2021  3:15 PM Jorge Promise, PT [facility]   3/11/2021 11:00 AM Riya Velasco, PT [facility]   3/15/2021 11:00 AM Jorge Promise, PT [facility]   3/17/2021 11:45 AM Jorge Promise, PT [facility]   3/22/2021 11:00 AM Jorge Promise, PT [facility]   3/24/2021 10:15 AM Jorge Promise, PT [facility]   3/29/2021 11:00 AM Jorge Promise, PT [facility]   3/31/2021 11:45 AM Jorge Promise, PT [facility]

## 2021-03-03 ENCOUNTER — APPOINTMENT (OUTPATIENT)
Dept: PHYSICAL THERAPY | Age: 66
End: 2021-03-03
Payer: MEDICARE

## 2021-03-04 ENCOUNTER — HOSPITAL ENCOUNTER (OUTPATIENT)
Dept: PHYSICAL THERAPY | Age: 66
Discharge: HOME | End: 2021-03-04
Payer: MEDICARE

## 2021-03-04 PROCEDURE — 97110 THERAPEUTIC EXERCISES: CPT | Performed by: PHYSICAL THERAPIST

## 2021-03-04 NOTE — PROGRESS NOTES
PT DAILY TREATMENT NOTE    Patient Name: Yobany Banks  Date:3/4/2021  : 1955  [x]  Patient  Verified  Payor: [place] MEDICARE / Plan: [place] MEDICARE PART A & B / Product Type: Medicare /    In time:1354  Out time:1444  Total Treatment Time (min): 50  Total Timed Codes (min): 50  1:1 Treatment Time (MC only): 50   Visit #: 3 of 16    Treatment Area: Low back pain [M54.5]  Sciatica, left side [M54.32]    SUBJECTIVE  Pain Level (0-10 scale): 4 left buttock   Any medication changes, allergies to medications, adverse drug reactions, diagnosis change, or new procedure performed?: [x] No    [] Yes (see summary sheet for update)  Subjective functional status/changes:   [] No changes reported  Pt notes getting some cramping in HS with alem position hip flexor quad stretch ( discussed ways to keep HS from cramping)     Pt doing her HEP and walking daily   Walking is getting less painful     OBJECTIVE        50 min Therapeutic Exercise:  [x]? See flow sheet :   Rationale: increase ROM, increase strength and improve coordination to improve the patients ability to perform ADL's, walk and take care of/playwith  young children                                                          With   [] TE   [] TA   [] neuro   [] other: Patient Education: [x] Review HEP    [] Progressed/Changed HEP based on:   [] positioning   [] body mechanics   [] transfers   [] heat/ice application    [] other:      Other Objective/Functional Measures:   SLS each 3 -4 sec at time without hand hold , with 2 finger hold up to 9-10 sec      Pain Level (0-10 scale) post treatment: 0 LB , 4 left hip     ASSESSMENT/Changes in Function: pt tolerated ex well no adverse affect with therapy.  Able to brace foot into stretch to keep from HS cramping with alem position Hip flexor quad stretch     Patient will continue to benefit from skilled PT services to modify and progress therapeutic interventions, address functional mobility deficits, address ROM deficits, address strength deficits, analyze and address soft tissue restrictions and analyze and cue movement patterns to attain remaining goals. [x]  See Plan of Care  []  See progress note/recertification  []  See Discharge Summary         Progress towards goals / Updated goals:  Short Term Goals: STG- To be accomplished in 4 week(s):  1.  Pt will be compliant with HEP for max progression toward all goals and return to PLOF. Eval:started on HEP double knees to chest , single knee chest , bridge   Current : pt reports doing HEP and walking for ex slow and as tolerated 3/4/2021      2. Pt pain will improve >= 40% to allow pt improved sleep and tolerance to daily activity. Eval:10+ per pt max pain  Current : 5/10 in last 3-4 days  3/4/2021       3. Pt FOTO score will increase by >=7 points to show improvement in functional mobility. Eval:47/100  *will reasses every 5th visit      Long Term Goals: LTG- To be accomplished in 8 week(s):     1.  Pt will be independant with HEP for continued and ongoing progression following discharge toward full functional mobility. Eval:started on HEP      2. Pt pain will improve >= 80% to allow pt return to PLOF. Eval:10+ max pain     3. Pt FOTO score will increase by >=15 points to show improvement in functional mobility. Eval:47/100    will reassess every 5th visit      4. Pt strength will improve to >=4+ in core and bilateral LE to allow pt to return to PLOF , improve walking tolerance and control   Eval:Hip flexion 3+/5 bilaterally , quad right 5/5 , left 3+/5, HS 4+/5 right , 4-/5 left DF ankle and toe extension 5/5      5.  Pt ROM will improve to WNL trunk mobility  to allow pt to return to PLOF full functional mobility and increase ease with childcare.   Eval:pressups 50%, standing extension 7 deg , fingers to mid shin flexion , trunk rotation right close to 75% left 25%     6.  Pt will be able to ambulate up and down stairs with 1 rail reciprocal step over step.   Eval: 1 step at time with 1 rail     PLAN  [x]  Upgrade activities as tolerated     [x]  Continue plan of care  []  Update interventions per flow sheet       []  Discharge due to:_  []  Other:_      Melida Rhodes, PT 3/4/2021  2:05 PM    Future Appointments   Date Time Provider Yanira Raygoza   3/5/2021  1:00 PM Virl Black, PT [facility]   3/9/2021  3:15 PM Kayleendon Brock, PT [facility]   3/11/2021 11:00 AM Virl Black, PT [facility]   3/15/2021 11:00 AM Kayleen Ligas, PT [facility]   3/17/2021 11:45 AM Kayleen Ligas, PT [facility]   3/22/2021 11:00 AM Kayleen Ligas, PT [facility]   3/24/2021 10:15 AM Kayleen Ligas, PT [facility]   3/29/2021 11:00 AM Kayleen Ligas, PT [facility]   3/31/2021 11:45 AM Kayleen Ligas, PT [facility]

## 2021-03-05 ENCOUNTER — HOSPITAL ENCOUNTER (OUTPATIENT)
Dept: PHYSICAL THERAPY | Age: 66
Discharge: HOME | End: 2021-03-05
Payer: MEDICARE

## 2021-03-05 PROCEDURE — 97110 THERAPEUTIC EXERCISES: CPT

## 2021-03-05 NOTE — PROGRESS NOTES
PT DAILY TREATMENT NOTE - [facility]     Patient Name: Manolo Andrew  Date:3/5/2021  : 1955  [x]  Patient  Verified  [place]or: [place] MED[place] / Plan: [place] MED[place]ARE PART A & B / Product Type: Medicare /    In time:1305  Out time:1343  Total Treatment Time (min): 38  Total Timed Codes (min): 38   1:1 Treatment Time ( only): 38   Visit #: 4 of 16    Treatment Area: Low back pain [M54.5]  Sciatica, left side [M54.32]    SUBJECTIVE  Pain Level (0-10 scale): 5  Any medication changes, allergies to medications, adverse drug reactions, diagnosis change, or new procedure performed?: [x] No    [] Yes (see summary sheet for update)  Subjective functional status/changes:   [] No changes reported    Patient reports that she is sore from yesterday's exercise. OBJECTIVE    38 min Therapeutic Exercise:  [x] See flow sheet :   Rationale: increase ROM, increase strength and decrease pain to improve the patients ability to complete ADLs          With   [x] TE   [] TA   [] neuro   [] other: Patient Education: [x] Review HEP    [] Progressed/Changed HEP based on:   [] positioning   [] body mechanics   [] transfers   [] heat/ice application    [] other:      Other Objective/Functional Measures: NA     Pain Level (0-10 scale) post treatment: 5    ASSESSMENT/Changes in Function: Patient responds well to treatment session. Reduced exercise intensity as patient reported significant muscle soreness from previous visit. Adjusted patient's schedule next week to avoid back to back session. Will advance exercise as tolerated.  No adverse effects were noted from today's treatment session    Patient will continue to benefit from skilled PT services to modify and progress therapeutic interventions, address functional mobility deficits, address ROM deficits, address strength deficits, analyze and address soft tissue restrictions, analyze and cue movement patterns, analyze and modify body mechanics/ergonomics, assess and modify postural abnormalities, address imbalance and instruct in home and community integration to attain remaining goals. []  See Plan of Care  []  See progress note/recertification  []  See Discharge Summary         Progress towards goals / Updated goals:  Short Term Goals: STG- To be accomplished in 4 week(s):  1.  Pt will be compliant with HEP for max progression toward all goals and return to PLOF. Eval:started on HEP double knees to chest , single knee chest , bridge   Current : pt reports doing HEP and walking for ex slow and as tolerated 3/4/2021      2. Pt pain will improve >= 40% to allow pt improved sleep and tolerance to daily activity. Eval:10+ per pt max pain  Current : 5/10 in last 3-4 days  3/4/2021       3. Pt FOTO score will increase by >=7 points to show improvement in functional mobility. Eval:47/100  *will reasses every 5th visit      Long Term Goals: LTG- To be accomplished in 8 week(s):     1.  Pt will be independant with HEP for continued and ongoing progression following discharge toward full functional mobility. Eval:started on HEP      2. Pt pain will improve >= 80% to allow pt return to PLOF. Eval:10+ max pain     3. Pt FOTO score will increase by >=15 points to show improvement in functional mobility. Eval:47/100    will reassess every 5th visit      4. Pt strength will improve to >=4+ in core and bilateral LE to allow pt to return to PLOF , improve walking tolerance and control   Eval:Hip flexion 3+/5 bilaterally , quad right 5/5 , left 3+/5, HS 4+/5 right , 4-/5 left DF ankle and toe extension 5/5      5.  Pt ROM will improve to WNL trunk mobility  to allow pt to return to PLOF full functional mobility and increase ease with childcare.   Eval:pressups 50%, standing extension 7 deg , fingers to mid shin flexion , trunk rotation right close to 75% left 25%     6.  Pt will be able to ambulate up and down stairs with 1 rail reciprocal step over step.   Eval: 1 step at time with 1 rail     PLAN  []  Upgrade activities as tolerated     [x]  Continue plan of care  []  Update interventions per flow sheet       []  Discharge due to:_  []  Other:_      Meggan Torres, PT, DPT 3/5/2021  12:56 PM    Future Appointments   Date Time Provider Yanira Raygoza   3/5/2021  1:00 PM Prentis Hunger, PT [facility]   3/9/2021  3:15 PM Prentis Hunger, PT [facility]   3/11/2021 11:00 AM Silas Flushing, PT [facility]   3/15/2021 11:00 AM Prentis Hunger, PT [facility]   3/17/2021 11:45 AM Prentis Hunger, PT [facility]   3/22/2021 11:00 AM Prentis Hunger, PT [facility]   3/24/2021 10:15 AM Prentis Hunger, PT [facility]   3/29/2021 11:00 AM Prentis Hunger, PT [facility]   3/31/2021 11:45 AM Prentis Hunger, PT [facility]     .sm

## 2021-03-09 ENCOUNTER — HOSPITAL ENCOUNTER (OUTPATIENT)
Dept: PHYSICAL THERAPY | Age: 66
Discharge: HOME | End: 2021-03-09
Payer: MEDICARE

## 2021-03-09 PROCEDURE — 97110 THERAPEUTIC EXERCISES: CPT

## 2021-03-09 NOTE — PROGRESS NOTES
PT DAILY TREATMENT NOTE - [facility]     Patient Name: Chet Olszewski  Date:3/9/2021  : 1955  [x]  Patient  Verified  Payor: [place] MEDICARE / Plan: [place] MEDICARE PART A & B / Product Type: Medicare /    In time:1515  Out time:1600  Total Treatment Time (min): 45  Total Timed Codes (min): 45   1:1 Treatment Time ( [address] only): 45   Visit #: 5 of 16    Treatment Area: Low back pain [M54.5]  Sciatica, left side [M54.32]    SUBJECTIVE  Pain Level (0-10 scale): 0  Any medication changes, allergies to medications, adverse drug reactions, diagnosis change, or new procedure performed?: [x] No    [] Yes (see summary sheet for update)  Subjective functional status/changes:   [] No changes reported    Patient reports that she is feeling better today but feels stiff. OBJECTIVE    45 min Therapeutic Exercise:  [x] See flow sheet :   Rationale: increase ROM, increase strength and decrease pain to improve the patients ability to complete ADLs          With   [x] TE   [] TA   [] neuro   [] other: Patient Education: [x] Review HEP    [] Progressed/Changed HEP based on:   [] positioning   [] body mechanics   [] transfers   [] heat/ice application    [] other:      Other Objective/Functional Measures: NA     Pain Level (0-10 scale) post treatment: 0    ASSESSMENT/Changes in Function: Patient responds well to treatment session. Reintroduced 3 way hip and patient was challenged with the exercise but was able to complete all reps. Will reintroduce balance activities next visit. No adverse effects were noted from today's treatment session.       Patient will continue to benefit from skilled PT services to modify and progress therapeutic interventions, address functional mobility deficits, address ROM deficits, address strength deficits, analyze and address soft tissue restrictions, analyze and cue movement patterns, analyze and modify body mechanics/ergonomics, assess and modify postural abnormalities, address imbalance and instruct in home and community integration to attain remaining goals. []  See Plan of Care  []  See progress note/recertification  []  See Discharge Summary         Progress towards goals / Updated goals:  Short Term Goals: STG- To be accomplished in 4 week(s):  1.  Pt will be compliant with HEP for max progression toward all goals and return to PLOF. Eval:started on HEP double knees to chest , single knee chest , bridge   Current : pt reports doing HEP and walking for ex slow and as tolerated 3/4/2021      2. Pt pain will improve >= 40% to allow pt improved sleep and tolerance to daily activity. Eval:10+ per pt max pain  Current :  MET 0-2/10 in last 3-4 days  3/9/2021       3. Pt FOTO score will increase by >=7 points to show improvement in functional mobility. Eval:47/100  *will reasses every 5th visit      Long Term Goals: LTG- To be accomplished in 8 week(s):     1.  Pt will be independant with HEP for continued and ongoing progression following discharge toward full functional mobility. Eval:started on HEP      2. Pt pain will improve >= 80% to allow pt return to PLOF. Eval:10+ max pain     3. Pt FOTO score will increase by >=15 points to show improvement in functional mobility. Eval:47/100    will reassess every 5th visit      4. Pt strength will improve to >=4+ in core and bilateral LE to allow pt to return to PLOF , improve walking tolerance and control   Eval:Hip flexion 3+/5 bilaterally , quad right 5/5 , left 3+/5, HS 4+/5 right , 4-/5 left DF ankle and toe extension 5/5      5.  Pt ROM will improve to WNL trunk mobility  to allow pt to return to PLOF full functional mobility and increase ease with childcare.   Eval:pressups 50%, standing extension 7 deg , fingers to mid shin flexion , trunk rotation right close to 75% left 25%     6.  Pt will be able to ambulate up and down stairs with 1 rail reciprocal step over step.   Eval: 1 step at time with 1 rail     PLAN  []  Upgrade activities as tolerated [x]  Continue plan of care  []  Update interventions per flow sheet       []  Discharge due to:_  []  Other:_      Iglesia Hoffman, PT, DPT 3/9/2021  3:42 PM    Future Appointments   Date Time Provider Yanira Raygoza   3/12/2021  2:30 PM Luis Fernando Leon, PT [facility]   3/15/2021 11:00 AM Luis Fernando Leon, PT [facility]   3/17/2021 11:45 AM Luis Fernando Leon, PT [facility]   3/22/2021 11:00 AM Luis Fernando Leon, PT [facility]   3/24/2021 10:15 AM Luis Fernando Leon, PT [facility]   3/29/2021 11:00 AM Luis Fernando Leon, PT [facility]   3/31/2021 11:45 AM Luis Fernando Leon, PT [facility]

## 2021-03-11 ENCOUNTER — APPOINTMENT (OUTPATIENT)
Dept: PHYSICAL THERAPY | Age: 66
End: 2021-03-11
Payer: MEDICARE

## 2021-03-12 ENCOUNTER — HOSPITAL ENCOUNTER (OUTPATIENT)
Dept: PHYSICAL THERAPY | Age: 66
Discharge: HOME | End: 2021-03-12
Payer: MEDICARE

## 2021-03-12 PROCEDURE — 97110 THERAPEUTIC EXERCISES: CPT

## 2021-03-12 PROCEDURE — 97530 THERAPEUTIC ACTIVITIES: CPT

## 2021-03-12 PROCEDURE — 97112 NEUROMUSCULAR REEDUCATION: CPT

## 2021-03-12 NOTE — PROGRESS NOTES
PT DAILY TREATMENT NOTE     Patient Name: Blaine Humphrey  Date:3/12/2021  : 1955  [x]  Patient  Verified  Payor: [place] MEDICARE / Plan: [place] MEDICARE PART A & B / Product Type: Medicare /    In time:234  Out time:318  Total Treatment Time (min): 44  Visit #: 6 of 16    Medicare/BCBS Only   Total Timed Codes (min):  44 1:1 Treatment Time:  44       Treatment Area: Low back pain [M54.5]  Sciatica, left side [M54.32]    SUBJECTIVE  Pain Level (0-10 scale): 3  Any medication changes, allergies to medications, adverse drug reactions, diagnosis change, or new procedure performed?: [x] No    [] Yes (see summary sheet for update)  Subjective functional status/changes:   [] No changes reported  Patient reported slight low back pain upon arrival    OBJECTIVE        24 min Therapeutic Exercise:  [x] See flow sheet :   Rationale: increase ROM and increase strength to improve the patients ability to perform ADLs    10 min Therapeutic Activity:  [x]  See flow sheet :   Rationale: increase strength and improve coordination  to improve the patients ability to perform ADLs     10 min Neuromuscular Re-education:  []  See flow sheet :   Rationale: improve coordination and increase proprioception  to improve the patients ability to perform ADLs            With   [] TE   [] TA   [] neuro   [] other: Patient Education: [x] Review HEP    [] Progressed/Changed HEP based on:   [] positioning   [] body mechanics   [] transfers   [] heat/ice application    [] other:      Other Objective/Functional Measures:   Progressed therex per flow sheet     Pain Level (0-10 scale) post treatment: 3    ASSESSMENT/Changes in Function: patient tolerated progressed therex well without c/o increased pain.     Patient will continue to benefit from skilled PT services to modify and progress therapeutic interventions, address functional mobility deficits, address ROM deficits, address strength deficits, analyze and address soft tissue restrictions and analyze and cue movement patterns to attain remaining goals. [x]  See Plan of Care  []  See progress note/recertification  []  See Discharge Summary         Progress towards goals / Updated goals:  Short Term Goals: STG- To be accomplished in 4 week(s):  1.  Pt will be compliant with HEP for max progression toward all goals and return to PLOF. Eval:started on HEP double knees to chest , single knee chest , bridge   Current : pt reports doing HEP and walking for ex slow and as tolerated 3/4/2021      2. Pt pain will improve >= 40% to allow pt improved sleep and tolerance to daily activity. Eval:10+ per pt max pain  Current :  MET 0-2/10 in last 3-4 days  3/9/2021       3. Pt FOTO score will increase by >=7 points to show improvement in functional mobility. Eval:47/100  *will reasses every 5th visit      Long Term Goals: LTG- To be accomplished in 8 week(s):     1.  Pt will be independant with HEP for continued and ongoing progression following discharge toward full functional mobility. Eval:started on HEP      2. Pt pain will improve >= 80% to allow pt return to PLOF. Eval:10+ max pain     3. Pt FOTO score will increase by >=15 points to show improvement in functional mobility. Eval:47/100    will reassess every 5th visit      4. Pt strength will improve to >=4+ in core and bilateral LE to allow pt to return to PLOF , improve walking tolerance and control   Eval:Hip flexion 3+/5 bilaterally , quad right 5/5 , left 3+/5, HS 4+/5 right , 4-/5 left DF ankle and toe extension 5/5      5.  Pt ROM will improve to WNL trunk mobility  to allow pt to return to PLOF full functional mobility and increase ease with childcare.   Eval:pressups 50%, standing extension 7 deg , fingers to mid shin flexion , trunk rotation right close to 75% left 25%     6.  Pt will be able to ambulate up and down stairs with 1 rail reciprocal step over step.   Eval: 1 step at time with 1 rail        PLAN  []  Upgrade activities as tolerated     [x]  Continue plan of care  []  Update interventions per flow sheet       []  Discharge due to:_  []  Other:_      Joan Esqueda, TRELL 3/12/2021  2:38 PM    Future Appointments   Date Time Provider Yanira Raygoza   3/15/2021 11:00 AM Tenna Jermaine, PT [facility]   3/17/2021 11:45 AM Tenna Jermaine, PT [facility]   3/22/2021 11:00 AM Tenna Jermaine, PT [facility]   3/24/2021 10:15 AM Tenna Jermaine, PT [facility]   3/29/2021 11:00 AM Tenna Jermaine, PT [facility]   3/31/2021 11:45 AM Tenna Jermaine, PT [facility]

## 2021-03-15 ENCOUNTER — HOSPITAL ENCOUNTER (OUTPATIENT)
Dept: PHYSICAL THERAPY | Age: 66
Discharge: HOME | End: 2021-03-15
Payer: MEDICARE

## 2021-03-15 PROCEDURE — 97112 NEUROMUSCULAR REEDUCATION: CPT | Performed by: PHYSICAL THERAPIST

## 2021-03-15 PROCEDURE — 97530 THERAPEUTIC ACTIVITIES: CPT | Performed by: PHYSICAL THERAPIST

## 2021-03-15 PROCEDURE — 97110 THERAPEUTIC EXERCISES: CPT | Performed by: PHYSICAL THERAPIST

## 2021-03-15 NOTE — PROGRESS NOTES
PT DAILY TREATMENT NOTE    Patient Name: Bennie Marte  Date:3/15/2021  : 1955  [x]  Patient  Verified  Payor: [employer] / Plan: [place] MEDICARE PART A & B / Product Type: Medicare /    In time: 1107 Out time:1202  Total Treatment Time (min): 55  Total Timed Codes (min): 55  1:1 Treatment Time ( only): 55   Visit #: 7 of 16    Treatment Area: Low back pain [M54.5]  Sciatica, left side [M54.32]    SUBJECTIVE  Pain Level (0-10 scale): 3  Any medication changes, allergies to medications, adverse drug reactions, diagnosis change, or new procedure performed?: [x] No    [] Yes (see summary sheet for update)  Subjective functional status/changes:   [] No changes reported  Pt noted increase pain with walking a lot for couple hours of shopping go tup to 5/10 , but went home ice which helped back down to 3   Notes pain in low back into both buttock down left leg to knee     OBJECTIVE        30 min Therapeutic Exercise:  [x]? See flow sheet :   Rationale: increase ROM and increase strength to improve the patients ability to perform ADLs     10 min Therapeutic Activity:  [x]? See flow sheet :   Rationale: increase strength and improve coordination  to improve the patients ability to perform ADLs     15 min Neuromuscular Re-education:  []?   See flow sheet :   Rationale: improve coordination and increase proprioception  to improve the patients ability to perform ADLs             With   [] TE   [] TA   [] neuro   [] other: Patient Education: [x] Review HEP    [] Progressed/Changed HEP based on:   [] positioning   [] body mechanics   [] transfers   [] heat/ice application    [] other:      Other Objective/Functional Measures:   Switched to 6\" step for step up, staying at 4\" for lateral    Added resist with orange tband for SLR 3 way ,    SLS 6 sec right , 3-5 sec left   Added wall climber harder with left leg but performed well     FOTO: 52/100     Pain Level (0-10 scale) post treatment: 3    ASSESSMENT/Changes in Function: tolerated increase step height with forward step up but more difficulty with left leg , kept to 4\" lateral unable to go up to 6\" , smooth control with added tband for 3 ways hip ,     Patient will continue to benefit from skilled PT services to modify and progress therapeutic interventions, address functional mobility deficits, address ROM deficits, address strength deficits, analyze and address soft tissue restrictions, analyze and cue movement patterns, analyze and modify body mechanics/ergonomics, assess and modify postural abnormalities, address imbalance/dizziness and instruct in home and community integration to attain remaining goals. [x]  See Plan of Care  []  See progress note/recertification  []  See Discharge Summary         Progress towards goals / Updated goals:  Short Term Goals: STG- To be accomplished in 4 week(s):  1.  Pt will be compliant with HEP for max progression toward all goals and return to PLOF. Eval:started on HEP double knees to chest , single knee chest , bridge   Current :continuing to report doing HEP and walking for ex slow and as tolerated 3/15/2021      2. Pt pain will improve >= 40% to allow pt improved sleep and tolerance to daily activity. Eval:10+ per pt max pain  Current :  MET 0-2/10 in last 3-4 days  3/9/2021       3. Pt FOTO score will increase by >=7 points to show improvement in functional mobility. Eval:47/100  *will reasses every 5th visit      Long Term Goals: LTG- To be accomplished in 8 week(s):     1.  Pt will be independant with HEP for continued and ongoing progression following discharge toward full functional mobility. Eval:started on HEP      2. Pt pain will improve >= 80% to allow pt return to PLOF. Eval:10+ max pain  Current : highest pain lately 5/10 , but always there at least some per pt ,progressing  3/15/2021      3. Pt FOTO score will increase by >=15 points to show improvement in functional mobility.    Eval:47/100    will reassess every 5th visit      4. Pt strength will improve to >=4+ in core and bilateral LE to allow pt to return to PLOF , improve walking tolerance and control   Eval:Hip flexion 3+/5 bilaterally , quad right 5/5 , left 3+/5, HS 4+/5 right , 4-/5 left DF ankle and toe extension 5/5      5.  Pt ROM will improve to WNL trunk mobility  to allow pt to return to PLOF full functional mobility and increase ease with childcare.   Eval:pressups 50%, standing extension 7 deg , fingers to mid shin flexion , trunk rotation right close to 75% left 25%     6.  Pt will be able to ambulate up and down stairs with 1 rail reciprocal step over step.   Eval: 1 step at time with 1 rail   Current : still 1 step at time, same 3/15/2021     PLAN  [x]  Upgrade activities as tolerated     [x]  Continue plan of care  []  Update interventions per flow sheet       []  Discharge due to:_  []  Other:_      Gregory Becerra, PT 3/15/2021  11:13 AM    Future Appointments   Date Time Provider Yanira Raygoza   3/17/2021 11:45 AM Kathy Reyes, PT [facility]   3/22/2021 11:00 AM Kathy Reyes PT [facility]   3/24/2021 10:15 AM Kathy Reyes PT [facility]   3/29/2021 11:00 AM Kathy Reyes PT [facility]   3/31/2021 11:45 AM [facility]NE [facility]

## 2021-03-17 ENCOUNTER — HOSPITAL ENCOUNTER (OUTPATIENT)
Dept: PHYSICAL THERAPY | Age: 66
Discharge: HOME | End: 2021-03-17
Payer: MEDICARE

## 2021-03-17 PROCEDURE — 97110 THERAPEUTIC EXERCISES: CPT

## 2021-03-17 NOTE — PROGRESS NOTES
PT DAILY TREATMENT NOTE - [facility]     Patient Name: Amanda Krause  Date:3/17/2021  : 1955  [x]  Patient  Verified  Payor: Jacqueline Duran / Plan: [place] / Product Type: Medicare /    In ZYPU:[id number]  Out time:1236  Total Treatment Time (min): 44  Total Timed Codes (min): 44   1:1 Treatment Time ( only): 40   Visit #: 6 of 16    Treatment Area: Low back pain [M54.5]  Sciatica, left side [M54.32]    SUBJECTIVE  Pain Level (0-10 scale): 4  Any medication changes, allergies to medications, adverse drug reactions, diagnosis change, or new procedure performed?: [x] No    [] Yes (see summary sheet for update)  Subjective functional status/changes:   [] No changes reported    Patient reports that she has lateral hip soreness today. OBJECTIVE    44 min Therapeutic Exercise:  [] See flow sheet :   Rationale: increase ROM, increase strength and decrease pain to improve the patients ability to complete ADLs          With   [x] TE   [] TA   [] neuro   [] other: Patient Education: [x] Review HEP    [] Progressed/Changed HEP based on:   [] positioning   [] body mechanics   [] transfers   [] heat/ice application    [] other:      Other Objective/Functional Measures: NA     Pain Level (0-10 scale) post treatment: 0    ASSESSMENT/Changes in Function: Patient responds well to treatment session as she had a reduction in symptom. Provided cues to reduce compensatory strategies with step ups and STS exercise. She was challenged with exercise prescribed.  No adverse effects were noted from today's treatment session    Patient will continue to benefit from skilled PT services to modify and progress therapeutic interventions, address functional mobility deficits, address ROM deficits, address strength deficits, analyze and address soft tissue restrictions, analyze and cue movement patterns, analyze and modify body mechanics/ergonomics, assess and modify postural abnormalities, address imbalance/dizziness and instruct in home and community integration to attain remaining goals. []  See Plan of Care  []  See progress note/recertification  []  See Discharge Summary         Progress towards goals / Updated goals:  Short Term Goals: STG- To be accomplished in 4 week(s):  1.  Pt will be compliant with HEP for max progression toward all goals and return to PLOF. Eval:started on HEP double knees to chest , single knee chest , bridge   Current : pt reports doing HEP and walking for ex slow and as tolerated 3/4/2021      2. Pt pain will improve >= 40% to allow pt improved sleep and tolerance to daily activity. Eval:10+ per pt max pain  Current :  MET 0-2/10 in last 3-4 days  3/9/2021       3. Pt FOTO score will increase by >=7 points to show improvement in functional mobility. Eval:47/100  *will reasses every 5th visit      Long Term Goals: LTG- To be accomplished in 8 week(s):     1.  Pt will be independant with HEP for continued and ongoing progression following discharge toward full functional mobility. Eval:started on HEP      2. Pt pain will improve >= 80% to allow pt return to PLOF. Eval:10+ max pain     3. Pt FOTO score will increase by >=15 points to show improvement in functional mobility. Eval:47/100    will reassess every 5th visit      4. Pt strength will improve to >=4+ in core and bilateral LE to allow pt to return to PLOF , improve walking tolerance and control   Eval:Hip flexion 3+/5 bilaterally , quad right 5/5 , left 3+/5, HS 4+/5 right , 4-/5 left DF ankle and toe extension 5/5      5. Pt ROM will improve to WNL trunk mobility  to allow pt to return to PLOF full functional mobility and increase ease with childcare.   Eval:pressups 50%, standing extension 7 deg , fingers to mid shin flexion , trunk rotation right close to 75% left 25%     6.  Pt will be able to ambulate up and down stairs with 1 rail reciprocal step over step.   Eval: 1 step at time with 1 rail   Current:   In-progress, Patient is performing 4 inch step 2 x 10 with mild hand hold support and proper mechanics, 3/17/2021    PLAN  []  Upgrade activities as tolerated     [x]  Continue plan of care  []  Update interventions per flow sheet       []  Discharge due to:_  []  Other:_      Otf Cohen, PT, DPT 3/17/2021  11:42 AM    Future Appointments   Date Time Provider Yanira Raygoza   3/17/2021 11:45 AM Simona Dean PT [facility]   3/22/2021 11:00 AM Simona Dean PT [facility]   3/24/2021 10:15 AM Simona Dean, PT [facility]   3/29/2021 11:00 AM Simona Dean, PT [facility]   3/31/2021 11:45 AM Simona Dean, PT [facility]

## 2021-03-22 ENCOUNTER — HOSPITAL ENCOUNTER (OUTPATIENT)
Dept: PHYSICAL THERAPY | Age: 66
Discharge: HOME | End: 2021-03-22
Payer: MEDICARE

## 2021-03-22 PROCEDURE — 97110 THERAPEUTIC EXERCISES: CPT | Performed by: PHYSICAL THERAPIST

## 2021-03-22 NOTE — PROGRESS NOTES
PT DAILY TREATMENT NOTE    Patient Name: Meg Dong  Date:3/22/2021  : 1955  [x]  Patient  Verified  Payor: Frederic Lane / Plan: [place] MED[place] PART A & B / Product Type: Medicare /    In time:1102 Out time:1203  Total Treatment Time (min): 61  Total Timed Codes (min): 61  1:1 Treatment Time ( [address] only): 61  Visit #: 9 of 16    Treatment Area: Low back pain [M54.5]  Sciatica, left side [M54.32]    SUBJECTIVE  Pain Level (0-10 scale): 3  Any medication changes, allergies to medications, adverse drug reactions, diagnosis change, or new procedure performed?: [x] No    [] Yes (see summary sheet for update)  Subjective functional status/changes:   [] No changes reported  Pt reports feels she is approx 50% better since starting therapy notes it is still difficult to get in and out of the car but easier to get up and down from there toilet . She reports she is walking farther , she uses her cane in the community but walking 20-30 min with mild rest in between. OBJECTIVE  61 min Therapeutic Exercise:  [x]? See flow sheet :   Rationale: increase ROM, increase strength and decrease pain to improve the patients ability to complete ADLs          With   [] TE   [] TA   [] neuro   [] other: Patient Education: [x] Review HEP    [] Progressed/Changed HEP based on:   [] positioning   [] body mechanics   [] transfers   [] heat/ice application    [] other:      Other Objective/Functional Measures:   Balance   SLS right up to 3-7 sec , left 3-4 sec without hand hold . tandem 30 sec able to perform each without hand hold .      MMT :   4-/5 bilat , quad right 5/5 ,left 4+/5 , DF bilaterally 5/5 , hamstring 5/5 bilaterally , ER 3+/5 right , 3/5 left     Trunk ROM : pressup 75% , standing extension close to 20 deg , LTR right full , left 75% , standing flexion 3\" fingertip to floor , finger to distal shin      Pain Level (0-10 scale) post treatment: 3    ASSESSMENT/Changes in Function: pt slowly progressing with balance with tandem and SLS with 1 finger hold , attempting no hold still very difficult. Strength improving , ROM trunk improved , step up on 6-8\" step slowly improving able to step up on 8 \" step with bilateral arm support to help press up , mm fatigue but no pain end of session , progressing well . No adverse affect with therapy . Patient will continue to benefit from skilled PT services to modify and progress therapeutic interventions, address functional mobility deficits, address ROM deficits, address strength deficits, analyze and address soft tissue restrictions, analyze and cue movement patterns, analyze and modify body mechanics/ergonomics, assess and modify postural abnormalities and address imbalance/dizziness to attain remaining goals. [x]  See Plan of Care  []  See progress note/recertification  []  See Discharge Summary         Progress towards goals / Updated goals:  Short Term Goals: STG- To be accomplished in 4 week(s):  1.  Pt will be compliant with HEP for max progression toward all goals and return to PLOF. Eval:started on HEP double knees to chest , single knee chest , bridge   Current : pt reports doing HEP and walking for ex slow 20-30 min weekdays  3/22/2021 compliance MET      2.Pt pain will improve >= 40% to allow pt improved sleep and tolerance to daily activity. Eval:10+ per pt max pain  Current :  MET 0-2/10 in last 3-4 days  3/9/2021         3. Pt FOTO score will increase by >=7 points to show improvement in functional mobility. Eval:47/100  3/15/2021 : improved to 52/100 ( 5 points improved ) progressing      Long Term Goals: LTG- To be accomplished in 8 week(s):     1.  Pt will be independant with HEP for continued and ongoing progression following discharge toward full functional mobility. Eval:started on HEP      2. Pt pain will improve >= 80% to allow pt return to PLOF.    Eval:10+ max pain  Current : pain ranges 0-4 sleeping well , average is usually at most a 3/10 progressing 3/22/2021      3. Pt FOTO score will increase by >=15 points to show improvement in functional mobility. Eval:47/100   Current :  improved to 52/100 ( 5 points improved ) progressing      4. Pt strength will improve to >=4+ in core and bilateral LE to allow pt to return to PLOF , improve walking tolerance and control   Eval:Hip flexion 3+/5 bilaterally , quad right 5/5 , left 3+/5, HS 4+/5 right , 4-/5 left DF ankle and toe extension 5/5   Current : 4-/5 bilat , quad right 5/5 ,left 4+/5 , DF bilaterally 5/5 , hamstring 5/5 bilaterally , ER 3+/5 right , 3/5 left progressing 3/22/2021      5. Pt ROM will improve to WNL trunk mobility  to allow pt to return to PLOF full functional mobility and increase ease with childcare.   Eval:pressups 50%, standing extension 7 deg , fingers to mid shin flexion , trunk rotation right close to 75% left 25%  Current : progressing  pressup 75% , standing extension close to 20 deg , LTR right full , left 75% , standing flexion 3\" fingertip to floor , finger to distal shin 3/22/2021      6.  Pt will be able to ambulate up and down stairs with 1 rail reciprocal step over step.   Eval: 1 step at time with 1 rail   Current: In-progress, Patient demonstrated step over step 2 rails on 4\" step , very hesitant on 6\" step but did perform with 2 rails and stiffer gait.  Progressing  3/22/2021     PLAN  [x]  Upgrade activities as tolerated     [x]  Continue plan of care  []  Update interventions per flow sheet       []  Discharge due to:_  []  Other:_      Taylor Bradley PT 3/22/2021  11:37 AM    Future Appointments   Date Time Provider Yanira Raygoza   3/24/2021 10:15 AM VIVIENNE Crow [facility]   3/29/2021 11:00 AM Bethany Anderson PT [facility]   3/31/2021 11:45 AM VIVIENNE Crow [facility]

## 2021-03-22 NOTE — PROGRESS NOTES
[facility] at [address] [place] [address] Drive: [phone]   Fax: [phone]  Progress Note  Patient Name: Jasmina Lilly : 1955   Medical   Diagnosis: Low back pain [M54.5]  Sciatica, left side [M54.32] Treatment Diagnosis: low back pain left side sciatica   Onset Date: Dec 25, 2020     Referral Source: Gina Harp DO Start of Care [facility]): 2021   Prior Hospitalization: See medical history Provider #: [id number]   Medications: Verified on Patient Summary List    Comorbidities: arthritis , high blood pressure , hx of TKR , hx of back neck shoulder pain with fx in right shoulder    Prior Level of Function: pt able to bend , lift and walk ,navigate stairs, drive and travel and take care of 4 and 6 y/o children without problems .   ===========================================================================================  Assessment / Summary of Care:  Jasmina Lilly is a 76 y.o.  female who has been seen in physical therapy for 9 appts and is progressing well with less pain , improved functional mobility , increase strength and increase in general trunk ROM . Pt is progressing well toward all goals as noted below.     ===========================================================================================    Plan:Continue therapy per initial plan/protocol at a frequency of  2 x per week for 4 weeks    Progress Towards Goals:  Short Term Goals: STG- To be accomplished in 4 week(s):  1.  Pt will be compliant with HEP for max progression toward all goals and return to PLOF. Eval:started on HEP double knees to chest , single knee chest , bridge   Current : pt reports doing HEP and walking for ex slow 20-30 min weekdays  3/22/2021 compliance MET      2.Pt pain will improve >= 40% to allow pt improved sleep and tolerance to daily activity. Eval:10+ per pt max pain  Current :  MET 0-2/10 in last 3-4 days  3/9/2021          3.  Pt FOTO score will increase by >=7 points to show improvement in functional mobility. Eval:47/100  3/15/2021 : improved to 52/100 ( 5 points improved ) progressing      Long Term Goals: LTG- To be accomplished in 8 week(s):     1.  Pt will be independant with HEP for continued and ongoing progression following discharge toward full functional mobility. Eval:started on HEP      2. Pt pain will improve >= 80% to allow pt return to PLOF. Eval:10+ max pain  Current : pain ranges 0-4 sleeping well , average is usually at most a 3/10 progressing 3/22/2021      3. Pt FOTO score will increase by >=15 points to show improvement in functional mobility. Eval:47/100   Current :  improved to 52/100 ( 5 points improved ) progressing      4. Pt strength will improve to >=4+ in core and bilateral LE to allow pt to return to PLOF , improve walking tolerance and control   Eval:Hip flexion 3+/5 bilaterally , quad right 5/5 , left 3+/5, HS 4+/5 right , 4-/5 left DF ankle and toe extension 5/5   Current : 4-/5 bilat , quad right 5/5 ,left 4+/5 , DF bilaterally 5/5 , hamstring 5/5 bilaterally , ER 3+/5 right , 3/5 left progressing 3/22/2021      5. Pt ROM will improve to WNL trunk mobility  to allow pt to return to PLOF full functional mobility and increase ease with childcare.   Eval:pressups 50%, standing extension 7 deg , fingers to mid shin flexion , trunk rotation right close to 75% left 25%  Current : progressing  pressup 75% , standing extension close to 20 deg , LTR right full , left 75% , standing flexion 3\" fingertip to floor , finger to distal shin 3/22/2021      6.  Pt will be able to ambulate up and down stairs with 1 rail reciprocal step over step.   Eval: 1 step at time with 1 rail   Current:  In-progress, Patient demonstrated step over step 2 rails on 4\" step , very hesitant on 6\" step but did perform with 2 rails and stiffer gait.  Progressing  3/22/2021 ===========================================================================================  Subjective: Pt reports feels she is approx 50% better since starting therapy notes it is still difficult to get in and out of the car but easier to get up and down from there toilet . She reports she is walking farther , she uses her cane in the community but walking 20-30 min with mild rest in between. Therapist Signature: Mike CORMIER Date: 2/12/2855   Re-Certification: ANISH Time: 12:22 PM         [facility] at [address]                    Phone: [phone]   Fax: [phone]  .

## 2021-03-24 ENCOUNTER — HOSPITAL ENCOUNTER (OUTPATIENT)
Dept: PHYSICAL THERAPY | Age: 66
Discharge: HOME | End: 2021-03-24
Payer: MEDICARE

## 2021-03-24 PROCEDURE — 97530 THERAPEUTIC ACTIVITIES: CPT

## 2021-03-24 PROCEDURE — 97110 THERAPEUTIC EXERCISES: CPT

## 2021-03-24 NOTE — PROGRESS NOTES
PT DAILY TREATMENT NOTE - [facility]     Patient Name: Jorje Najera  Date:3/24/2021  : 1955  [x]  Patient  Verified  Payor: Irene Gutierrez / Plan: [place] MEDICARE PART A & B / Product Type: Medicare /    In time:1023  Out time:1109  Total Treatment Time (min): 46  Total Timed Codes (min): 46   1:1 Treatment Time ( [address] only): 46   Visit #: 7 of 17    Treatment Area: Low back pain [M54.5]  Sciatica, left side [M54.32]    SUBJECTIVE  Pain Level (0-10 scale): 2  Any medication changes, allergies to medications, adverse drug reactions, diagnosis change, or new procedure performed?: [x] No    [] Yes (see summary sheet for update)  Subjective functional status/changes:   [] No changes reported    Patient reports less pain today. She states that she has discomfort in left buttocks. OBJECTIVE    30 min Therapeutic Exercise:  [x] See flow sheet :   Rationale: increase ROM, increase strength and decrease pain to improve the patients ability to complete ADLs    16 min Therapeutic Activity:  [x]  See flow sheet :   Rationale: increase ROM, increase strength and improve coordination  to improve the patients ability to complete ADLs          With   [x] TE   [] TA   [] neuro   [] other: Patient Education: [x] Review HEP    [] Progressed/Changed HEP based on:   [] positioning   [] body mechanics   [] transfers   [] heat/ice application    [] other:      Other Objective/Functional Measures: NA     Pain Level (0-10 scale) post treatment: 0    ASSESSMENT/Changes in Function: Patient responds well to treatment session. Provided cues for activity pacing. During step ups added mirror for visual feedback to reduce trunk compensatory strategies secondary to hip weakness. Added hip hiking to HEP.  No adverse effects were noted from today's treatment session    Patient will continue to benefit from skilled PT services to modify and progress therapeutic interventions, address functional mobility deficits, address ROM deficits, address strength deficits, analyze and address soft tissue restrictions, analyze and cue movement patterns, analyze and modify body mechanics/ergonomics, assess and modify postural abnormalities, address imbalance and instruct in home and community integration to attain remaining goals. []  See Plan of Care  []  See progress note/recertification  []  See Discharge Summary         Progress towards goals / Updated goals:  Short Term Goals: STG- To be accomplished in 4 week(s):  1.  Pt will be compliant with HEP for max progression toward all goals and return to PLOF. Eval:started on HEP double knees to chest , single knee chest , bridge   Current : pt reports doing HEP and walking for ex slow 20-30 min weekdays  3/22/2021 compliance MET      2. Pt pain will improve >= 40% to allow pt improved sleep and tolerance to daily activity. Eval:10+ per pt max pain  Current :  MET 0-2/10 in last 3-4 days  3/9/2021          3. Pt FOTO score will increase by >=7 points to show improvement in functional mobility. Eval:47/100  3/15/2021 : improved to 52/100 ( 5 points improved ) progressing      Long Term Goals: LTG- To be accomplished in 8 week(s):     1.  Pt will be independant with HEP for continued and ongoing progression following discharge toward full functional mobility. Eval:started on HEP      o2. Pt pain will improve >= 80% to allow pt return to PLOF. Eval:10+ max pain  Current : pain ranges 0-4 sleeping well , average is usually at most a 3/10 progressing 3/22/2021      3. Pt FOTO score will increase by >=15 points to show improvement in functional mobility. Eval:47/100   Current :  improved to 52/100 ( 5 points improved ) progressing      4.  Pt strength will improve to >=4+ in core and bilateral LE to allow pt to return to PLOF , improve walking tolerance and control   Eval:Hip flexion 3+/5 bilaterally , quad right 5/5 , left 3+/5, HS 4+/5 right , 4-/5 left DF ankle and toe extension 5/5   Current : 4-/5 bilat , quad right 5/5 ,left 4+/5 , DF bilaterally 5/5 , hamstring 5/5 bilaterally , ER 3+/5 right , 3/5 left progressing 3/22/2021      5. Pt ROM will improve to WNL trunk mobility  to allow pt to return to PLOF full functional mobility and increase ease with childcare.   Eval:pressups 50%, standing extension 7 deg , fingers to mid shin flexion , trunk rotation right close to 75% left 25%  Current : progressing  pressup 75% , standing extension close to 20 deg , LTR right full , left 75% , standing flexion 3\" fingertip to floor , finger to distal shin 3/22/2021      6.  Pt will be able to ambulate up and down stairs with 1 rail reciprocal step over step.   Eval: 1 step at time with 1 rail   Current:  In-progress, Patient demonstrated step over step 2 rails on 4\" step , very hesitant on 6\" step but did perform with 2 rails and stiffer gait.  Progressing  3/22/2021     PLAN  []  Upgrade activities as tolerated     [x]  Continue plan of care  []  Update interventions per flow sheet       []  Discharge due to:_  []  Other:_      Lita Hurtado, PT, DPT 3/24/2021  10:15 AM    Future Appointments   Date Time Provider Yanira Raygoza   3/29/2021 11:00 AM Nick Hernandez, VIVIENNE ANDREW [facility]   3/31/2021 11:45 AM VIVIENNE Estrada [facility]   4/5/2021 11:00 AM Jeff NelsonHPGISSELLE [facility]   4/12/2021 11:00 AM VIVIENNE WanHPGISSELLE [facility]   4/14/2021 10:15 AM VIVIENNE Wilder [facility]

## 2021-03-29 ENCOUNTER — HOSPITAL ENCOUNTER (OUTPATIENT)
Dept: PHYSICAL THERAPY | Age: 66
Discharge: HOME | End: 2021-03-29
Payer: MEDICARE

## 2021-03-29 PROCEDURE — 97110 THERAPEUTIC EXERCISES: CPT | Performed by: PHYSICAL THERAPIST

## 2021-03-29 PROCEDURE — 97530 THERAPEUTIC ACTIVITIES: CPT | Performed by: PHYSICAL THERAPIST

## 2021-03-29 NOTE — PROGRESS NOTES
PT DAILY TREATMENT NOTE    Patient Name: Liat Rios  Date:3/29/2021  : 1955  [x]  Patient  Verified  Payor: Thom Gilbert / Plan: [place] MED[place] & B / Product Type: Medicare /    In time:1108  Out time:1200  Total Treatment Time (min): 52  Total Timed Codes (min): 52  1:1 Treatment Time ( [address] only): 48   Visit #: 11 of 17    Treatment Area: Low back pain [M54.5]  Sciatica, left side [M54.32]    SUBJECTIVE  Pain Level (0-10 scale): 1 left buttock   Any medication changes, allergies to medications, adverse drug reactions, diagnosis change, or new procedure performed?: [x] No    [] Yes (see summary sheet for update)  Subjective functional status/changes:   [x] No changes reported  Pt reports : In the morning when first get up pain post left thigh to mid thigh up to 4/10 , then with moving in the morning decreases to 1/10 then during day can get up to 2/10 worse with sitting , legs stiff with walking too long ( can walk up to 40 min )     OBJECTIVE  36 min Therapeutic Exercise:  [x]? See flow sheet :   Rationale: increase ROM, increase strength and decrease pain to improve the patients ability to complete ADLs     16 min Therapeutic Activity:  [x]? See flow sheet :   Rationale: increase ROM, increase strength and improve coordination  to improve the patients ability to complete ADLs          With   [] TE   [] TA   [] neuro   [] other: Patient Education: [x] Review HEP    [] Progressed/Changed HEP based on:   [] positioning   [] body mechanics   [] transfers   [] heat/ice application    [] other:      Other Objective/Functional Measures:   See ex grid      Pain Level (0-10 scale) post treatment: 0 just fatigue mm soreness no pain     ASSESSMENT/Changes in Function: pt tolerating all ex , more difficult stepping up on 8\" step with left and lowering from 6\" step needs to use 2 rails for mod assist. Sit to stand pt tendency to lean far forward without cues better with focusing on motion and verbal cues.  No adverse affect with therapy . Patient will continue to benefit from skilled PT services to modify and progress therapeutic interventions, address functional mobility deficits, address ROM deficits, address strength deficits, analyze and address soft tissue restrictions, analyze and cue movement patterns, analyze and modify body mechanics/ergonomics, assess and modify postural abnormalities and address imbalance/dizziness to attain remaining goals. [x]  See Plan of Care  []  See progress note/recertification  []  See Discharge Summary         Progress towards goals / Updated goals:  Short Term Goals: STG- To be accomplished in 4 week(s):  1.  Pt will be compliant with HEP for max progression toward all goals and return to PLOF. Eval:started on HEP double knees to chest , single knee chest , bridge   Current : pt reports doing HEP and walking for ex slow 20-30 min weekdays  3/22/2021 compliance MET      2. Pt pain will improve >= 40% to allow pt improved sleep and tolerance to daily activity. Eval:10+ per pt max pain  Current :  MET 0-2/10 in last 3-4 days  3/9/2021          3. Pt FOTO score will increase by >=7 points to show improvement in functional mobility. Eval:47/100  3/15/2021 : improved to 52/100 ( 5 points improved ) progressing      Long Term Goals: LTG- To be accomplished in 8 week(s):     1.  Pt will be independant with HEP for continued and ongoing progression following discharge toward full functional mobility. Eval:started on HEP  Current : continuing to progress HEP some cues for posture and control with ex . Issued Green tband for LE ex ,  Progressing 3/29/2021       o2. Pt pain will improve >= 80% to allow pt return to PLOF. Eval:10+ max pain  Current : pain ranges 0-4 sleeping well , average is usually at most a 2/10 progressing 3/29/2021      3. Pt FOTO score will increase by >=15 points to show improvement in functional mobility.    Eval:47/100   Current :  improved to 52/100 ( 5 points improved ) progressing      4. Pt strength will improve to >=4+ in core and bilateral LE to allow pt to return to PLOF , improve walking tolerance and control   Eval:Hip flexion 3+/5 bilaterally , quad right 5/5 , left 3+/5, HS 4+/5 right , 4-/5 left DF ankle and toe extension 5/5   Current : 4-/5 bilat , quad right 5/5 ,left 4+/5 , DF bilaterally 5/5 , hamstring 5/5 bilaterally , ER 3+/5 right , 3/5 left progressing 3/22/2021      5. Pt ROM will improve to WNL trunk mobility  to allow pt to return to PLOF full functional mobility and increase ease with childcare.   Eval:pressups 50%, standing extension 7 deg , fingers to mid shin flexion , trunk rotation right close to 75% left 25%  Current : progressing  pressup 75% , standing extension close to 20 deg , LTR right full , left 75% , standing flexion 3\" fingertip to floor , finger to distal shin 3/22/2021      6.  Pt will be able to ambulate up and down stairs with 1 rail reciprocal step over step.   Eval: 1 step at time with 1 rail   Current:  In-progress, Patient demonstrated step over step 2 rails on 4\" step , very hesitant on 6\" step but did perform with 2 rails and stiffer gait.  Progressing  3/22/2021        PLAN  [x]  Upgrade activities as tolerated     [x]  Continue plan of care  []  Update interventions per flow sheet       []  Discharge due to:_  []  Other:_      Pamela Grimes PT 3/29/2021  11:44 AM    Future Appointments   Date Time Provider Yanira Raygoza   3/31/2021 11:45 AM VIVIENNE Brian [facility]   4/5/2021 11:00 AM Remesic, [place] [facility]   4/12/2021 11:00 AM VIVIENNE Webb [facility]   4/14/2021 10:15 AM VIVIENNE Mccarthy [facility]

## 2021-03-31 ENCOUNTER — HOSPITAL ENCOUNTER (OUTPATIENT)
Dept: PHYSICAL THERAPY | Age: 66
Discharge: HOME | End: 2021-03-31
Payer: MEDICARE

## 2021-03-31 PROCEDURE — 97530 THERAPEUTIC ACTIVITIES: CPT

## 2021-03-31 PROCEDURE — 97110 THERAPEUTIC EXERCISES: CPT

## 2021-03-31 NOTE — PROGRESS NOTES
PT DAILY TREATMENT NOTE - [facility]     Patient Name: Penny Oshea  Date:3/31/2021  : 1955  [x]  Patient  Verified  Payor: Jacky Gomes / Plan: [place] MED[place] PART A & B / Product Type: Medicare /    In BTLS:  Out time:1245  Total Treatment Time (min): 51  Total Timed Codes (min): 51   1:1 Treatment Time ( [address] only): 51   Visit #: 12 of 17    Treatment Area: Low back pain [M54.5]  Sciatica, left side [M54.32]    SUBJECTIVE  Pain Level (0-10 scale): 1  Any medication changes, allergies to medications, adverse drug reactions, diagnosis change, or new procedure performed?: [x] No    [] Yes (see summary sheet for update)  Subjective functional status/changes:   [] No changes reported    Patient reports that she feels like she is improving. She states that she needs her cane less around the home. She states that she has some left hip and gluteal discomfort. OBJECTIVE    35 min Therapeutic Exercise:  [x] See flow sheet :   Rationale: increase ROM, increase strength and decrease pain to improve the patients ability to complete ADLs    16 min Therapeutic Activity:  [x]  See flow sheet :   Rationale: increase ROM, increase strength and improve coordination  to improve the patients ability to complete ADLs            With   [x] TE   [] TA   [] neuro   [] other: Patient Education: [x] Review HEP    [] Progressed/Changed HEP based on:   [] positioning   [] body mechanics   [] transfers   [] heat/ice application    [] other:      Other Objective/Functional Measures: NA     Pain Level (0-10 scale) post treatment: 0    ASSESSMENT/Changes in Function: Patient responds well to treatment session. Patient is demonstrating improved activity tolerance. She required minimal cues to reduce trunk compensatory strategies to compensate for hip weakness with step up exercise. Introduced leg press and patient was challenged with the exercise.  No adverse effects were noted from today's treatment session      Patient will continue to benefit from skilled PT services to modify and progress therapeutic interventions, address functional mobility deficits, address ROM deficits, address strength deficits, analyze and address soft tissue restrictions, analyze and cue movement patterns, analyze and modify body mechanics/ergonomics, assess and modify postural abnormalities, address imbalance and instruct in home and community integration to attain remaining goals. []  See Plan of Care  []  See progress note/recertification  []  See Discharge Summary         Progress towards goals / Updated goals:  Short Term Goals: STG- To be accomplished in 4 week(s):  1.  Pt will be compliant with HEP for max progression toward all goals and return to PLOF. Eval:started on HEP double knees to chest , single knee chest , bridge   Current : pt reports doing HEP and walking for ex slow 20-30 min weekdays  3/22/2021 compliance MET      2. Pt pain will improve >= 40% to allow pt improved sleep and tolerance to daily activity. Eval:10+ per pt max pain  Current :  MET 0-2/10 in last 3-4 days  3/9/2021       3. Pt FOTO score will increase by >=7 points to show improvement in functional mobility. Eval:47/100  3/15/2021 : improved to 52/100 ( 5 points improved ) progressing      Long Term Goals: LTG- To be accomplished in 8 week(s):     1.  Pt will be independant with HEP for continued and ongoing progression following discharge toward full functional mobility. Eval:started on HEP  Current : continuing to progress HEP some cues for posture and control with ex . Issued Green tband for LE ex ,  Progressing 3/29/2021       o2. Pt pain will improve >= 80% to allow pt return to PLOF. Eval:10+ max pain  Current : pain ranges 0-4 sleeping well , average is usually at most a 2/10 progressing 3/29/2021      3. Pt FOTO score will increase by >=15 points to show improvement in functional mobility.    Eval:47/100   Current :  improved to 52/100 ( 5 points improved ) progressing    4. Pt strength will improve to >=4+ in core and bilateral LE to allow pt to return to PLOF , improve walking tolerance and control   Eval:Hip flexion 3+/5 bilaterally , quad right 5/5 , left 3+/5, HS 4+/5 right , 4-/5 left DF ankle and toe extension 5/5   Current : 4-/5 bilat , quad right 5/5 ,left 4+/5 , DF bilaterally 5/5 , hamstring 5/5 bilaterally , ER 3+/5 right , 3/5 left progressing 3/22/2021      5. Pt ROM will improve to WNL trunk mobility  to allow pt to return to PLOF full functional mobility and increase ease with childcare.   Eval:pressups 50%, standing extension 7 deg , fingers to mid shin flexion , trunk rotation right close to 75% left 25%  Current : progressing  pressup 75% , standing extension close to 20 deg , LTR right full , left 75% , standing flexion 3\" fingertip to floor , finger to distal shin 3/22/2021      6.  Pt will be able to ambulate up and down stairs with 1 rail reciprocal step over step.   Eval: 1 step at time with 1 rail   Current:  In-progress, Patient demonstrated step over step 2 rails on 4\" step , very hesitant on 6\" step but did perform with 2 rails and stiffer gait.  Progressing  3/22/2021      PLAN  []  Upgrade activities as tolerated     [x]  Continue plan of care  []  Update interventions per flow sheet       []  Discharge due to:_  []  Other:_      Alem Andrea, PT, DPT 3/31/2021  11:53 AM    Future Appointments   Date Time Provider Yanira Raygoza   4/5/2021 11:00 AM Mariela Nelson THE [facility]   4/7/2021  4:00 PM VIVIENNE Hernandez THE [facility]   4/12/2021 11:00 AM VIVIENNE Hong THE [facility]   4/14/2021 10:15 AM VIVIENNE Armijo THE [facility]

## 2021-04-05 ENCOUNTER — HOSPITAL ENCOUNTER (OUTPATIENT)
Dept: PHYSICAL THERAPY | Age: 66
Discharge: HOME | End: 2021-04-05
Payer: MEDICARE

## 2021-04-05 PROCEDURE — 97530 THERAPEUTIC ACTIVITIES: CPT

## 2021-04-05 PROCEDURE — 97110 THERAPEUTIC EXERCISES: CPT

## 2021-04-05 PROCEDURE — 97112 NEUROMUSCULAR REEDUCATION: CPT

## 2021-04-05 NOTE — PROGRESS NOTES
PT DAILY TREATMENT NOTE - [facility]     Patient Name: Donna Gruber  Date:2021  : 1955  [x]  Patient  Verified  Payor: [place] MEDICARE / Plan: [place] MEDICARE PART A & B / Product Type: Medicare /    In time:11:03  Out time: 12:08  Total Treatment Time (min): 65  Total Timed Codes (min): 65  1:1 Treatment Time ([address] only): 60   Visit #: 13 of 17    Treatment Area: Low back pain [M54.5]  Sciatica, left side [M54.32]    SUBJECTIVE  Pain Level (0-10 scale): 0  Any medication changes, allergies to medications, adverse drug reactions, diagnosis change, or new procedure performed?: [x] No    [] Yes (see summary sheet for update)  Subjective functional status/changes:   [] No changes reported  Pt reporting no pain only muscle soreness in legs. OBJECTIVE    40 min Therapeutic Exercise:  [x] See flow sheet :   Rationale: increase ROM, increase strength, improve coordination, improve balance and increase proprioception to improve the patients ability to perform daily activities with decreased pain and symptom levels    10 min Therapeutic Activity:  [x]?   See flow sheet :   Rationale: increase ROM, increase strength and improve coordination  to improve the patients ability to complete ADLs    15 min Neuromuscular Re-education:  [x]  See flow sheet : tactile cues for PPT and clams   Rationale: increase strength, improve coordination and increase proprioception  to improve the patients ability to perform daily activities with decreased pain and symptom levels          With   [x] TE   [x] TA   [] neuro   [] other: Patient Education: [x] Review HEP    [] Progressed/Changed HEP based on:   [x] positioning   [x] body mechanics   [] transfers   [] heat/ice application    [] other:      Other Objective/Functional Measures:  Pt challenged by maintaining PPT with clamshells and bridges with decreased height noted  Bilateral knee valgus noted right>left with sit to stands  FOTO: 54    Pain Level (0-10 scale) post treatment: 2 ASSESSMENT/Changes in Function:  Pt tolerated session well with reporting slight increase in muscle pain following session. Pt challenged by maintaining PPT with bridges with inability to clear hips off table while maintaining PPT. Decreased resistance with clamshells which was still challenging for pt to complete with PPT and good form. Pt education on body mechanics and compensatory actions for decreased hip strength. Will continue to progress as tolerated. Patient will continue to benefit from skilled PT services to modify and progress therapeutic interventions, address functional mobility deficits, address ROM deficits, address strength deficits, analyze and address soft tissue restrictions, analyze and cue movement patterns, analyze and modify body mechanics/ergonomics, assess and modify postural abnormalities, address imbalance/dizziness and instruct in home and community integration to attain remaining goals. []  See Plan of Care  []  See progress note/recertification  []  See Discharge Summary         Progress towards goals / Updated goals:  Short Term Goals: STG- To be accomplished in 4 week(s):  1.  Pt will be compliant with HEP for max progression toward all goals and return to PLOF. Eval:started on HEP double knees to chest , single knee chest , bridge   Current : pt reports doing HEP and walking for ex slow 20-30 min weekdays  3/22/2021 compliance MET      2. Pt pain will improve >= 40% to allow pt improved sleep and tolerance to daily activity. Eval:10+ per pt max pain  Current :  MET 0-2/10 in last 3-4 days  3/9/2021       3. Pt FOTO score will increase by >=7 points to show improvement in functional mobility. Eval:47/100  3/15/2021 : improved to 52/100 ( 5 points improved ) progressing      Long Term Goals: LTG- To be accomplished in 8 week(s):     1.  Pt will be independant with HEP for continued and ongoing progression following discharge toward full functional mobility. Eval:started on HEP  Current : continuing to progress HEP some cues for posture and control with ex . Issued Green tband for LE ex ,  Progressing 3/29/2021       o2. Pt pain will improve >= 80% to allow pt return to PLOF. Eval:10+ max pain  Current : pain ranges 0-4 sleeping well , average is usually at most a 2/10 progressing 3/29/2021      3. Pt FOTO score will increase by >=15 points to show improvement in functional mobility. Eval:47/100   Current: 54 Progressing 4/5/21     4. Pt strength will improve to >=4+ in core and bilateral LE to allow pt to return to PLOF , improve walking tolerance and control   Eval:Hip flexion 3+/5 bilaterally , quad right 5/5 , left 3+/5, HS 4+/5 right , 4-/5 left DF ankle and toe extension 5/5   Current : 4-/5 bilat , quad right 5/5 ,left 4+/5 , DF bilaterally 5/5 , hamstring 5/5 bilaterally , ER 3+/5 right , 3/5 left progressing 3/22/2021      5. Pt ROM will improve to WNL trunk mobility  to allow pt to return to PLOF full functional mobility and increase ease with childcare.   Eval:pressups 50%, standing extension 7 deg , fingers to mid shin flexion , trunk rotation right close to 75% left 25%  Current : progressing  pressup 75% , standing extension close to 20 deg , LTR right full , left 75% , standing flexion 3\" fingertip to floor , finger to distal shin 3/22/2021      6.  Pt will be able to ambulate up and down stairs with 1 rail reciprocal step over step.   Eval: 1 step at time with 1 rail   Current:  In-progress, Patient demonstrated step over step 2 rails on 4\" step , very hesitant on 6\" step but did perform with 2 rails and stiffer gait.  Progressing  3/22/2021     PLAN  [x]  Upgrade activities as tolerated     [x]  Continue plan of care  []  Update interventions per flow sheet       []  Discharge due to:_  []  Other:_      Chelsie East, SPT 4/5/2021  11:08 AM    Future Appointments   Date Time Provider Yanira Raygoza   4/7/2021  4:00 PM Marina Sheriff THE [facility]   4/12/2021 11:00 AM Jonny Aguirre PT [facility]   4/14/2021 10:15 AM Tae Alvarez PT [facility]

## 2021-04-07 ENCOUNTER — APPOINTMENT (OUTPATIENT)
Dept: PHYSICAL THERAPY | Age: 66
End: 2021-04-07
Payer: MEDICARE

## 2021-04-12 ENCOUNTER — HOSPITAL ENCOUNTER (OUTPATIENT)
Dept: PHYSICAL THERAPY | Age: 66
Discharge: HOME | End: 2021-04-12
Payer: MEDICARE

## 2021-04-12 PROCEDURE — 97530 THERAPEUTIC ACTIVITIES: CPT | Performed by: PHYSICAL THERAPIST

## 2021-04-12 PROCEDURE — 97112 NEUROMUSCULAR REEDUCATION: CPT | Performed by: PHYSICAL THERAPIST

## 2021-04-12 PROCEDURE — 97110 THERAPEUTIC EXERCISES: CPT | Performed by: PHYSICAL THERAPIST

## 2021-04-12 NOTE — PROGRESS NOTES
PT DAILY TREATMENT NOTE    Patient Name: Gregory Roy  Date:2021  : 1955  [x]  Patient  Verified  Payor: Chester Carter / Plan: [place] MEDICARE PART A & B / Product Type: Medicare /    In time:1104  Out time:1210  Total Treatment Time (min): 66  Total Timed Codes (min): 66  1:1 Treatment Time ( [address] only): 55   Visit #: 14 of 17    Treatment Area: Low back pain [M54.5]  Sciatica, left side [M54.32]    SUBJECTIVE  Pain Level (0-10 scale): 3 buttock , 6 mm tightness in leg   Any medication changes, allergies to medications, adverse drug reactions, diagnosis change, or new procedure performed?: [x] No    [] Yes (see summary sheet for update)  Subjective functional status/changes:   [] No changes reported  Walking a lot over last week and pushing sister in wheel chair , one time experienced left knee buckled into hyperextension then ok , highest pain in last week 3/10     OBJECTIVE  41 min Therapeutic Exercise:  [x]? See flow sheet :   Rationale: increase ROM, increase strength, improve coordination, improve balance and increase proprioception to improve the patients ability to perform daily activities with decreased pain and symptom levels     10 min Therapeutic Activity:  [x]? ?  See flow sheet :   Rationale: increase ROM, increase strength and improve coordination  to improve the patients ability to complete ADLs     15 min Neuromuscular Re-education:  [x]?   See flow sheet : tactile cues for PPT    Rationale: increase strength, improve coordination and increase proprioception  to improve the patients ability to perform daily activities with decreased pain and symptom levels           With   [] TE   [] TA   [] neuro   [] other: Patient Education: [x] Review HEP    [] Progressed/Changed HEP based on:   [] positioning   [] body mechanics   [] transfers   [] heat/ice application    [] other:      Other Objective/Functional Measures: see grid for ex      Pain Level (0-10 scale) post treatment: 0 in low back and leg , mild discomfort upper back     ASSESSMENT/Changes in Function: progressing well , no adverse affect with therapy , pt progressing well with PPT , tolerated progressing back to [facility] theraband for clamshells. Difficulty with sit to stand tends to bend forward cues for more upright back. Patient will continue to benefit from skilled PT services to modify and progress therapeutic interventions, address functional mobility deficits, address ROM deficits, address strength deficits, analyze and address soft tissue restrictions, analyze and cue movement patterns, analyze and modify body mechanics/ergonomics, assess and modify postural abnormalities and address imbalance/dizziness to attain remaining goals. []  See Plan of Care  [x]  See progress note/recertification 2/51/74  []  See Discharge Summary         Progress towards goals / Updated goals:  Short Term Goals: STG- To be accomplished in 4 week(s):  1.  Pt will be compliant with HEP for max progression toward all goals and return to PLOF. Eval:started on HEP double knees to chest , single knee chest , bridge   Current : pt reports doing HEP and walking for ex slow 20-30 min weekdays  3/22/2021 compliance MET      2. Pt pain will improve >= 40% to allow pt improved sleep and tolerance to daily activity. Eval:10+ per pt max pain  Current :  MET 0-2/10 in last 3-4 days  3/9/2021       3. Pt FOTO score will increase by >=7 points to show improvement in functional mobility. Eval:47/100  3/15/2021 : improved to 52/100 ( 5 points improved ) progressing      Long Term Goals: LTG- To be accomplished in 8 week(s):     1.  Pt will be independant with HEP for continued and ongoing progression following discharge toward full functional mobility. Eval:started on HEP  Current : continuing to progress HEP some cues for posture and control with ex . Issued Green tband for LE ex ,  Progressing 3/29/2021       o2. Pt pain will improve >= 80% to allow pt return to PLOF. Eval:10+ max pain  Current : pain ranges 0-4 sleeping well , average is usually at most a 2/10 progressing 3/29/2021      3. Pt FOTO score will increase by >=15 points to show improvement in functional mobility. Eval:47/100   Current: 54 Progressing 4/5/21     4. Pt strength will improve to >=4+ in core and bilateral LE to allow pt to return to PLOF , improve walking tolerance and control   Eval:Hip flexion 3+/5 bilaterally , quad right 5/5 , left 3+/5, HS 4+/5 right , 4-/5 left DF ankle and toe extension 5/5   Current : 4-/5 bilat , quad right 5/5 ,left 4+/5 , DF bilaterally 5/5 , hamstring 5/5 bilaterally , ER 3+/5 right , 3/5 left progressing 3/22/2021      5. Pt ROM will improve to WNL trunk mobility  to allow pt to return to PLOF full functional mobility and increase ease with childcare.   Eval:pressups 50%, standing extension 7 deg , fingers to mid shin flexion , trunk rotation right close to 75% left 25%  PN 3/22/2021: progressing  pressup 75% , standing extension close to 20 deg , LTR right full , left 75% , standing flexion 3\" fingertip to floor , finger to distal shin    Current : progressing with step up onto 8\" step and up and over 6\" step smoother . 4/12/2021        6.  Pt will be able to ambulate up and down stairs with 1 rail reciprocal step over step.   Eval: 1 step at time with 1 rail   Current:  In-progress, Patient demonstrated step over step 2 rails on 4\" step , very hesitant on 6\" step but did perform with 2 rails and stiffer gait.  Progressing  3/22/2021     PLAN  [x]  Upgrade activities as tolerated     [x]  Continue plan of care  []  Update interventions per flow sheet       []  Discharge due to:_  []  Other:_      Jett Luna, PT 4/12/2021  11:14 AM    Future Appointments   Date Time Provider Yanira Raygoza   4/14/2021 10:15 AM [facility]

## 2021-04-14 ENCOUNTER — HOSPITAL ENCOUNTER (OUTPATIENT)
Dept: PHYSICAL THERAPY | Age: 66
Discharge: HOME | End: 2021-04-14
Payer: MEDICARE

## 2021-04-14 PROCEDURE — 97110 THERAPEUTIC EXERCISES: CPT

## 2021-04-14 NOTE — PROGRESS NOTES
PT DAILY TREATMENT NOTE - [facility]     Patient Name: Enid Man  Date:2021  : 1955  [x]  Patient  Verified  Payor: Angela Farmer / Plan: [place] & [place] / Product Type: Medicare /    In time:1016  Out time:1058  Total Treatment Time (min): 42  Total Timed Codes (min): 42   1:1 Treatment Time ( only): 42   Visit #: 15 of 17    Treatment Area: Low back pain [M54.5]  Sciatica, left side [M54.32]    SUBJECTIVE  Pain Level (0-10 scale): 1  Any medication changes, allergies to medications, adverse drug reactions, diagnosis change, or new procedure performed?: [x] No    [] Yes (see summary sheet for update)  Subjective functional status/changes:   [] No changes reported  Patient reports that she has some radicular pain in the left LE. She states that she is scheduled for an MRI. OBJECTIVE    42 min Therapeutic Exercise:  [x] See flow sheet :   Rationale: increase ROM, increase strength and decrease pain to improve the patients ability to complete ADLs          With   [x] TE   [] TA   [] neuro   [] other: Patient Education: [x] Review HEP    [] Progressed/Changed HEP based on:   [] positioning   [] body mechanics   [] transfers   [] heat/ice application    [] other:      Other Objective/Functional Measures: NA     Pain Level (0-10 scale) post treatment: 0    ASSESSMENT/Changes in Function: Patient responds well to treatment session. Patient had reports of radicular symptoms prior to treatment today. Reduced exercise intensity and focused on reducing radiating pain. She responded well to treatment reporting decreased pain.  No adverse effects were noted from today's treatment session      Patient will continue to benefit from skilled PT services to modify and progress therapeutic interventions, address functional mobility deficits, address ROM deficits, address strength deficits, analyze and address soft tissue restrictions, analyze and cue movement patterns, analyze and modify body mechanics/ergonomics, assess and modify postural abnormalities, address imbalance and instruct in home and community integration to attain remaining goals. []  See Plan of Care  []  See progress note/recertification  []  See Discharge Summary         Progress towards goals / Updated goals:  Short Term Goals: STG- To be accomplished in 4 week(s):  1.  Pt will be compliant with HEP for max progression toward all goals and return to PLOF. Eval:started on HEP double knees to chest , single knee chest , bridge   Current : pt reports doing HEP and walking for ex slow 20-30 min weekdays  3/22/2021 compliance MET      2. Pt pain will improve >= 40% to allow pt improved sleep and tolerance to daily activity. Eval:10+ per pt max pain  Current :  MET 0-2/10 in last 3-4 days  3/9/2021       3. Pt FOTO score will increase by >=7 points to show improvement in functional mobility. Eval:47/100  3/15/2021 : improved to 52/100 ( 5 points improved ) progressing      Long Term Goals: LTG- To be accomplished in 8 week(s):     1.  Pt will be independant with HEP for continued and ongoing progression following discharge toward full functional mobility. Eval:started on HEP  Current : continuing to progress HEP some cues for posture and control with ex . Issued Green tband for LE ex ,  Progressing 3/29/2021       o2. Pt pain will improve >= 80% to allow pt return to PLOF. Eval:10+ max pain  Current : pain ranges 0-4 sleeping well , average is usually at most a 2/10 progressing 3/29/2021      3. Pt FOTO score will increase by >=15 points to show improvement in functional mobility. Eval:47/100   Current: 54 Progressing 4/5/21     4.  Pt strength will improve to >=4+ in core and bilateral LE to allow pt to return to PLOF , improve walking tolerance and control   Eval:Hip flexion 3+/5 bilaterally , quad right 5/5 , left 3+/5, HS 4+/5 right , 4-/5 left DF ankle and toe extension 5/5   Current : 4-/5 bilat , quad right 5/5 ,left 4+/5 , DF bilaterally 5/5 , hamstring 5/5 bilaterally , ER 3+/5 right , 3/5 left progressing 3/22/2021      5. Pt ROM will improve to WNL trunk mobility  to allow pt to return to PLOF full functional mobility and increase ease with childcare.   Eval:pressups 50%, standing extension 7 deg , fingers to mid shin flexion , trunk rotation right close to 75% left 25%  PN 3/22/2021: progressing  pressup 75% , standing extension close to 20 deg , LTR right full , left 75% , standing flexion 3\" fingertip to floor , finger to distal shin    Current : progressing with step up onto 8\" step and up and over 6\" step smoother . 4/12/2021         6.  Pt will be able to ambulate up and down stairs with 1 rail reciprocal step over step.   Eval: 1 step at time with 1 rail   Current:  In-progress, Patient demonstrated step over step 2 rails on 4\" step , very hesitant on 6\" step but did perform with 2 rails and stiffer gait.  Progressing  3/22/2021      PLAN  []  Upgrade activities as tolerated     [x]  Continue plan of care  []  Update interventions per flow sheet       []  Discharge due to:_  []  Other:_      Treva Greenwood, PT, DPT 4/14/2021  10:14 AM    Future Appointments   Date Time Provider Yanira Raygoza   4/14/2021 10:15 AM Bre Malave, PT [facility]

## 2021-04-19 ENCOUNTER — HOSPITAL ENCOUNTER (OUTPATIENT)
Dept: PHYSICAL THERAPY | Age: 66
Discharge: HOME | End: 2021-04-19
Payer: MEDICARE

## 2021-04-19 PROCEDURE — 97110 THERAPEUTIC EXERCISES: CPT | Performed by: PHYSICAL THERAPIST

## 2021-04-19 NOTE — PROGRESS NOTES
PT DAILY TREATMENT NOTE    Patient Name: Keisha Lu  Date:2021  : 1955  [x]  Patient  Verified  Payor: Teri Bee / Plan: [place] MED[place] & [place] / Product Type: Medicare /    In time:1026  Out time:1121  Total Treatment Time (min): 55  Total Timed Codes (min): 55  1:1 Treatment Time ([facility] only): 45   Visit #: 16 of 17    Treatment Area: Low back pain [M54.5]  Sciatica, left side [M54.32]    SUBJECTIVE  Pain Level (0-10 scale): 3 sore   Any medication changes, allergies to medications, adverse drug reactions, diagnosis change, or new procedure performed?: [x] No    [] Yes (see summary sheet for update)  Subjective functional status/changes:   [] No changes reported  Pt has F/u with PA on injections , pt is getting an MRI tomorrow     OBJECTIVE  55 min Therapeutic Exercise:  [x]? See flow sheet :   Rationale: increase ROM, increase strength and decrease pain to improve the patients ability to complete ADLs            With   [] TE   [] TA   [] neuro   [] other: Patient Education: [x] Review HEP    [] Progressed/Changed HEP based on:   [] positioning   [] body mechanics   [] transfers   [] heat/ice application    [] other:      Other Objective/Functional Measures: added pressups and standing extension to HEP retaught use of lumbar roll . Tolerated full pressup arms fully extended . Pain Level (0-10 scale) post treatment: 2 sore left hip buttock     ASSESSMENT/Changes in Function: pt stiff upon entry , difficult with LTR very stiff initially , performed assisted knees to chest with feet on ball and noted pt felt right knee and general ROM looser when done and noted increase LTR following . Pt still with intermittent rad sx into left post thigh reassessed response to pressups and standing extension which pt reports centralized her pain, no more thigh pain with extension ex . Pt to repeat extension ex 3-4 x day till next session to see if she can keep left thigh rad sx centralized .  Also to use lumbar roll in sitting . No adverse affect with therapy . Patient will continue to benefit from skilled PT services to modify and progress therapeutic interventions, address functional mobility deficits, address ROM deficits, address strength deficits, analyze and address soft tissue restrictions, analyze and cue movement patterns, analyze and modify body mechanics/ergonomics, assess and modify postural abnormalities and address imbalance/dizziness to attain remaining goals. [x]  See Plan of Care  []  See progress note/recertification  []  See Discharge Summary         Progress towards goals / Updated goals:  Short Term Goals: STG- To be accomplished in 4 week(s):  1.  Pt will be compliant with HEP for max progression toward all goals and return to PLOF. Eval:started on HEP double knees to chest , single knee chest , bridge   Current : pt reports doing HEP and walking for ex slow 20-30 min weekdays  3/22/2021 compliance MET      2. Pt pain will improve >= 40% to allow pt improved sleep and tolerance to daily activity. Eval:10+ per pt max pain  Current :  MET 0-2/10 in last 3-4 days  3/9/2021       3. Pt FOTO score will increase by >=7 points to show improvement in functional mobility. Eval:47/100  3/15/2021 : improved to 52/100 ( 5 points improved ) progressing      Long Term Goals: LTG- To be accomplished in 8 week(s):     1.  Pt will be independant with HEP for continued and ongoing progression following discharge toward full functional mobility. Eval:started on HEP  Current : continuing to progress HEP some cues for posture and control with ex . Issued Green tband for LE ex ,  Progressing 3/29/2021       o2. Pt pain will improve >= 80% to allow pt return to PLOF. Eval:10+ max pain  Current : pain ranges 0-4 sleeping well , average is usually at most a 2/10 progressing, same 4/19/2021      3. Pt FOTO score will increase by >=15 points to show improvement in functional mobility. Eval:47/100   Current: 54 Progressing 4/5/21     4. Pt strength will improve to >=4+ in core and bilateral LE to allow pt to return to PLOF , improve walking tolerance and control   Eval:Hip flexion 3+/5 bilaterally , quad right 5/5 , left 3+/5, HS 4+/5 right , 4-/5 left DF ankle and toe extension 5/5   Current : 4-/5 bilat , quad right 5/5 ,left 4+/5 , DF bilaterally 5/5 , hamstring 5/5 bilaterally , ER 3+/5 right , 3/5 left progressing 3/22/2021      5. Pt ROM will improve to WNL trunk mobility  to allow pt to return to PLOF full functional mobility and increase ease with childcare.   Eval:pressups 50%, standing extension 7 deg , fingers to mid shin flexion , trunk rotation right close to 75% left 25%  PN 3/22/2021: progressing  pressup 75% , standing extension close to 20 deg , LTR right full , left 75% , standing flexion 3\" fingertip to floor , finger to distal shin    Current : full pressup arms completely extended. 4/19/2021         6.  Pt will be able to ambulate up and down stairs with 1 rail reciprocal step over step.   Eval: 1 step at time with 1 rail   Current:  In-progress, Patient demonstrated step over step 2 rails on 4\" step , very hesitant on 6\" step but did perform with 2 rails and stiffer gait.  Progressing  3/22/2021     PLAN  [x]  Upgrade activities as tolerated     [x]  Continue plan of care  []  Update interventions per flow sheet       []  Discharge due to:_  []  Other:_      Chari Jack, PT 4/19/2021  10:45 AM    Future Appointments   Date Time Provider Yanira Raygoza   4/22/2021  8:45 AM Bryant Martin, PT [facility]

## 2021-04-22 ENCOUNTER — HOSPITAL ENCOUNTER (OUTPATIENT)
Dept: PHYSICAL THERAPY | Age: 66
Discharge: HOME | End: 2021-04-22
Payer: MEDICARE

## 2021-04-22 PROCEDURE — 97112 NEUROMUSCULAR REEDUCATION: CPT | Performed by: PHYSICAL THERAPIST

## 2021-04-22 PROCEDURE — 97110 THERAPEUTIC EXERCISES: CPT | Performed by: PHYSICAL THERAPIST

## 2021-04-22 PROCEDURE — 97530 THERAPEUTIC ACTIVITIES: CPT | Performed by: PHYSICAL THERAPIST

## 2021-04-22 NOTE — PROGRESS NOTES
[facility] at [address] [place] [address]  Phone: [phone]   Fax: [phone]    Continued Plan of Care/ Re-certification for Physical Therapy Services    Patient name: Umang Mitchell Start of Care: 21   Referral source: Elena Plasencia DO : 1955   Medical/Treatment Diagnosis: Low back pain [M54.5]  Sciatica, left side [M54.32] Onset Date:2021     Prior Hospitalization: see medical history Provider#: [id number]   Medications: Verified on Patient Summary List    Comorbidities: arthritis , high blood pressure , hx of TKR , hx of back neck shoulder pain with fx in right shoulder    Prior Level of Function: pt able to bend , lift and walk ,navigate stairs, drive and travel and take care of 4 and 4 y/o children without problems .     Visits from Start of Care: 16    Missed Visits: 1 cx    The Plan of Care and following information is based on the patient's current status:    Short Term Goals: STG- To be accomplished in 4 week(s):  1.  Pt will be compliant with HEP for max progression toward all goals and return to PLOF. Eval:started on HEP double knees to chest , single knee chest , bridge   Current : pt reports doing HEP and walking for ex slow 20-30 min weekdays  3/22/2021 compliance MET      2. Pt pain will improve >= 40% to allow pt improved sleep and tolerance to daily activity. Eval:10+ per pt max pain  Current :  MET 0-2/10 in last 3-4 days  3/9/2021       3. Pt FOTO score will increase by >=7 points to show improvement in functional mobility. Eval:47/100  3/15/2021 : improved to 52/100 ( 5 points improved ) progressing   2021 : improved to 54/100 ( 7 points improved from initial eval , MET      Long Term Goals: LTG- To be accomplished in 8 week(s):     1.  Pt will be independant with HEP for continued and ongoing progression following discharge toward full functional mobility.    Eval:started on HEP  Current : continuing to progress HEP some cues for posture and control with ex . Issued Green and for LE ex ,  Progressing 3/29/2021       2. Pt pain will improve >= 80% to allow pt return to PLOF. Eval:10+ max pain  Current : pain ranges 0-4 sleeping well , average is usually at most a 2/10 progressing, same 4/19/2021      3. Pt FOTO score will increase by >=15 points to show improvement in functional mobility. Eval:47/100   Current: 54 Progressing 4/5/21 ( 7 points of improvement )      4. Pt strength will improve to >=4+ in core and bilateral LE to allow pt to return to PLOF , improve walking tolerance and control   Eval:Hip flexion 3+/5 bilaterally , quad right 5/5 , left 3+/5, HS 4+/5 right , 4-/5 left DF ankle and toe extension 5/5   Current : 4-/5 bilat , quad right 5/5 ,left 4+/5 , DF bilaterally 5/5 , hamstring 5/5 bilaterally , ER 3+/5 right , 3/5 left progressing 3/22/2021    4/19/2021 : pt still some difficulty with sit to stand strength but progressing in weight during sit to stand , progressing with resistance and reps with strenthening ex .      5. Pt ROM will improve to WNL trunk mobility  to allow pt to return to PLOF full functional mobility and increase ease with childcare.   Eval:pressups 50%, standing extension 7 deg , fingers to mid shin flexion , trunk rotation right close to 75% left 25%  PN 3/22/2021: progressing  pressup 75% , standing extension close to 20 deg , LTR right full , left 75% , standing flexion 3\" fingertip to floor , finger to distal shin    Current : full pressup arms completely extended. 4/19/2021      6.  Pt will be able to ambulate up and down stairs with 1 rail reciprocal step over step.   Eval: 1 step at time with 1 rail   Current:  In-progress, Patient demonstrated step over step 2 rails on 4\" step , very hesitant on 6\" step but did perform with 2 rails and stiffer gait.  Progressing  3/22/2021         Key functional changes: pt has met all her STGs and is making progress toward ROM , strength , functional balance, gait and stairs with LTGs. Sciatic pain in post left leg persisting but responded well to extension ex this session working toward abolishing radicular sx . Problems/ barriers to goal attainment: persisting radicular sx in to left leg mid thigh, hesitant on step down on stairs 6\" . SLS balance still limited with no hand hold to <6 sec. Problem List: pain affecting function, decrease ROM, decrease strength, impaired gait/ balance, decrease ADL/ functional abilitiies, decrease activity tolerance, decrease flexibility/ joint mobility and decrease transfer abilities    Treatment Plan: Therapeutic exercise, Therapeutic activities, Neuromuscular re-education, Physical agent/modality, Gait/balance training, Manual therapy, Aquatic therapy, Patient education, Self Care training, Functional mobility training, Home safety training and Stair training     Patient Goal (s) has been updated and includes: \"no more pain\" is a continued goal , working toward abolishing rad sx into left leg . Goals for this certification period to be accomplished in 4 weeks:  Continue toward all long term goals     Frequency / Duration: Patient to be seen 2 times per week for 4 weeks:    Assessment / Recommendations:pt is pleasant and motivated. She has Met all short term goals and is making progress toward long term goals. Recommend 4 more weeks to continue progression. Focusing this week on determining if more frequent extension mobility will further decrease and abolish rad sx to allow even further progress torward goals. Will work on smoother gait on stairs and improved balance. Reporting Period: 1/68/9890 - 0/36/7349   Certification Period: new period 4/19/2021 - 5/19/2021     Rio Morales PT 4/22/2021 8:22 AM    ________________________________________________________________________  I certify that the above Therapy Services are being furnished while the patient is under my care.  I agree with the treatment plan and certify that this therapy is necessary. [] I have read the above and request that my patient continue as recommended.   [] I have read the above report and request that my patient continue therapy with the following changes/special instructions: ______________________________________  [] I have read the above report and request that my patient be discharged from therapy    Physician's Signature:____________Date:_________TIME:________                                      Yovany Daev DO      ** Signature, Date and Time must be completed for valid certification **    Please sign and return to   [facility] at [address]     Phone: [phone]   Fax: [phone]

## 2021-04-22 NOTE — PROGRESS NOTES
PT DAILY TREATMENT NOTE    Patient Name: Sarmad Payne  Date:2021  : 1955  [x]  Patient  Verified  Payor: Amanda Samson / Plan: [place] MEDICARE PART A & B / Product Type: Medicare /    In SUYW:3263  Out time:1005   Total Treatment Time (min): 70   Total Timed Codes (min): 70   1:1 Treatment Time ([facility] only): 65    Visit #: 17 of     Treatment Area: Low back pain [M54.5]  Sciatica, left side [M54.32]    SUBJECTIVE  Pain Level (0-10 scale): 0  Any medication changes, allergies to medications, adverse drug reactions, diagnosis change, or new procedure performed?: [x] No    [] Yes (see summary sheet for update)  Subjective functional status/changes:   [] No changes reported  In Dec left sciatic pain all way down to her foot , since then with therapy pain staying in buttock and thigh , over 2 days after last appt doing  Press ups felt good took pain out of thigh just in buttocks but yesterday after MRI left leg flared up pain still to thigh but low back very aggravated, pt states did pressups and each time this calmed it down. Overall better by time went to bed . This morning no pain coming in just pulling in left buttock . OBJECTIVE  40 min Therapeutic Exercise:  [x]? See flow sheet :   Rationale: increase ROM, increase strength, improve coordination, improve balance and increase proprioception to improve the patients ability to perform daily activities with decreased pain and symptom levels     15 min Therapeutic Activity:  [x]? ?  See flow sheet :   Rationale: increase ROM, increase strength and improve coordination  to improve the patients ability to complete ADLs     15 min Neuromuscular Re-education:  [x]?   See flow sheet : reeducation with balance , dynamic gait control    Rationale: increase strength, improve coordination and increase proprioception  to improve the patients ability to perform daily activities with decreased pain and symptom levels      With   [] TE   [] TA   [] neuro   [] other: Patient Education: [x] Review HEP    [] Progressed/Changed HEP based on:   [] positioning   [] body mechanics   [] transfers   [] heat/ice application    [] other:      Other Objective/Functional Measures:   MMT in sitting : HF right 4 to 4+/5 , left 4/5   Quad 5/5 , HS right 4/5 , left 4+/5   abd sitting 5/5 right , 4/5 left   Add 5/5 bilateral  ER 3+/5 right , 3 to 3+/5 left      pressup initially 50% , following unilateral pressup with emphasis on left easier side noted bilateral pressup to full , also with left unilateral pressup able to free up right unilateral initially on ly 25 % increase to 75% ROM following left . Pt feels right leg mild shorter in gait , in supine grossly = malleoli , performed long leg distraction on right and pt reports this felt good and gait felt better. Gait still short stride mild trendelenburg but upright stance     Dynamic gait multiple reps marching with cues and close standby , butt kicks , side step     Pain Level (0-10 scale) post treatment: 0    ASSESSMENT/Changes in Function: repeated focus on stairs initially very stiff tends to twist and side step going down feel more fear of knee buckling , with cues and reps and repeated Heel dip practice able to perform up and down 4 to 6\" stairs smoother reciprocally with 2 hand holds. Wall climber smooth, responding very well to pressups for sciatic pain control and knees to chest in between for mm stretch . Progressing well no adverse affect with therapy. Patient will continue to benefit from skilled PT services to modify and progress therapeutic interventions, address functional mobility deficits, address ROM deficits, address strength deficits, analyze and address soft tissue restrictions, analyze and cue movement patterns, analyze and modify body mechanics/ergonomics, assess and modify postural abnormalities and address imbalance/dizziness to attain remaining goals.      []  See Plan of Care  [x]  See progress note/recertification  []  See Discharge Summary         Progress towards goals / Updated goals:  Long Term Goals: LTG- To be accomplished in 8 week(s):     1.  Pt will be independant with HEP for continued and ongoing progression following discharge toward full functional mobility. Eval:started on HEP  Current : continuing to progress HEP some cues for posture and control with ex . Cues for stairs 4/22/2021     2. Pt pain will improve >= 80% to allow pt return to PLOF. Eval:10+ max pain  Current : pain ranges 0-4 sleeping well , average is usually at most a 1-2/10 progressing, same 4/22/2021      3. Pt FOTO score will increase by >=15 points to show improvement in functional mobility. Eval:47/100   Current: 54 Progressing 4/5/21 ( 7 points of improvement )      4. Pt strength will improve to >=4+ in core and bilateral LE to allow pt to return to PLOF , improve walking tolerance and control   Eval:Hip flexion 3+/5 bilaterally , quad right 5/5 , left 3+/5, HS 4+/5 right , 4-/5 left DF ankle and toe extension 5/5   :3/22/2021HF   4-/5 bilat , quad right 5/5 ,left 4+/5 , DF bilaterally 5/5 , hamstring 5/5 bilaterally , ER 3+/5 right , 3/5 left progressing   Current MMT in sitting : HF right 4 to 4+/5 , left 4/5   Quad 5/5 , HS right 4/5 , left 4+/5   abd sitting 5/5 right , 4/5 left   Add 5/5 bilateral  ER 3+/5 right , 3 to 3+/5 left   ( ER hip same mild better left , less resist with HS today but stronger HF and quad ) progressing better with sit to stand functional strength with transfer better control holding 7# sit to stand from 20 \" table 20 reps .      5.  Pt ROM will improve to WNL trunk mobility  to allow pt to return to PLOF full functional mobility and increase ease with childcare.   Eval:pressups 50%, standing extension 7 deg , fingers to mid shin flexion , trunk rotation right close to 75% left 25%  PN 3/22/2021: progressing  pressup 75% , standing extension close to 20 deg , LTR right full , left 75% , standing flexion 3\" fingertip to floor , finger to distal shin    Current : full pressup arms completely extended. Pt states feels good knee to chest with extra overpressure at end range gross WNL  4/22/2021  pain responds best to pressups then knees to chest then pressups again.      6.  Pt will be able to ambulate up and down stairs with 1 rail reciprocal step over step.   Eval: 1 step at time with 1 rail   Current:  In-progress, Patient demonstrated step over step 2 rails on 4\" step , very hesitant on 6\" step but did perform with 2 rails and stiffer gait. Same but with cues smoother pattern  4/22/2021       PLAN  [x]  Upgrade activities as tolerated     [x]  Continue plan of care  []  Update interventions per flow sheet       []  Discharge due to:_  []  Other:_      Tiny Villagran, PT 4/22/2021  9:12 AM    No future appointments.

## 2021-04-27 ENCOUNTER — HOSPITAL ENCOUNTER (OUTPATIENT)
Dept: PHYSICAL THERAPY | Age: 66
Discharge: HOME | End: 2021-04-27
Payer: MEDICARE

## 2021-04-27 PROCEDURE — 97530 THERAPEUTIC ACTIVITIES: CPT

## 2021-04-27 PROCEDURE — 97110 THERAPEUTIC EXERCISES: CPT

## 2021-04-27 NOTE — PROGRESS NOTES
PT DAILY TREATMENT NOTE - [facility]     Patient Name: Tommie Benitez  Date:2021  : 1955  [x]  Patient  Verified  Payor: [employer] / Plan: [place] MED[place] & B / Product Type: Medicare /    In time:1435  Out time:1515  Total Treatment Time (min): 40  Total Timed Codes (min): 40   1:1 Treatment Time ( [address] only): 40   Visit #: 18 of 24    Treatment Area: Low back pain [M54.5]  Sciatica, left side [M54.32]    SUBJECTIVE  Pain Level (0-10 scale): 2  Any medication changes, allergies to medications, adverse drug reactions, diagnosis change, or new procedure performed?: [x] No    [] Yes (see summary sheet for update)  Subjective functional status/changes:   [] No changes reported    Patient reports that she has soreness in her low back. OBJECTIVE    30 min Therapeutic Exercise:  [x] See flow sheet :   Rationale: increase ROM, increase strength and decrease pain to improve the patients ability to complete ADLs    10 min Therapeutic Activity:  [x]  See flow sheet :   Rationale: increase ROM, increase strength and improve coordination  to improve the patients ability to complete ADLs           With   [x] TE   [] TA   [] neuro   [] other: Patient Education: [x] Review HEP    [] Progressed/Changed HEP based on:   [] positioning   [] body mechanics   [] transfers   [] heat/ice application    [] other:      Other Objective/Functional Measures: NA     Pain Level (0-10 scale) post treatment: 0    ASSESSMENT/Changes in Function: Patient responds well to treatment session. Patient is demonstrating improved autonomy with exercise. Her self-efficacy when negotiating stairs is improving require less encouragement to perform stairs with single UEA. Will advance exercise as tolerated.  No adverse effects were noted from today's treatment session      Patient will continue to benefit from skilled PT services to modify and progress therapeutic interventions, address functional mobility deficits, address ROM deficits, address strength deficits, analyze and address soft tissue restrictions, analyze and cue movement patterns, analyze and modify body mechanics/ergonomics, assess and modify postural abnormalities, address imbalance and instruct in home and community integration to attain remaining goals. []  See Plan of Care  []  See progress note/recertification  []  See Discharge Summary         Progress towards goals / Updated goals:  Long Term Goals: LTG- To be accomplished in 8 week(s):     1.  Pt will be independant with HEP for continued and ongoing progression following discharge toward full functional mobility. Eval:started on HEP  Current : In-progress, developing consistency with HEP, 4/27/2021     2. Pt pain will improve >= 80% to allow pt return to PLOF. Eval:10+ max pain  Current : pain ranges 0-4 sleeping well , average is usually at most a 1-2/10 progressing, same 4/22/2021      3. Pt FOTO score will increase by >=15 points to show improvement in functional mobility. Eval:47/100   Current: 52 Progressing 4/27/21      4. Pt strength will improve to >=4+ in core and bilateral LE to allow pt to return to PLOF , improve walking tolerance and control   Eval:Hip flexion 3+/5 bilaterally , quad right 5/5 , left 3+/5, HS 4+/5 right , 4-/5 left DF ankle and toe extension 5/5   :3/22/2021HF   4-/5 bilat , quad right 5/5 ,left 4+/5 , DF bilaterally 5/5 , hamstring 5/5 bilaterally , ER 3+/5 right , 3/5 left progressing   Current MMT in sitting : HF right 4 to 4+/5 , left 4/5   Quad 5/5 , HS right 4/5 , left 4+/5   abd sitting 5/5 right , 4/5 left   Add 5/5 bilateral  ER 3+/5 right , 3 to 3+/5 left   ( ER hip same mild better left , less resist with HS today but stronger HF and quad ) progressing better with sit to stand functional strength with transfer better control holding 7# sit to stand from 20 \" table 20 reps .      5.  Pt ROM will improve to WNL trunk mobility  to allow pt to return to PLOF full functional mobility and increase ease with childcare.   Eval:pressups 50%, standing extension 7 deg , fingers to mid shin flexion , trunk rotation right close to 75% left 25%  PN 3/22/2021: progressing  pressup 75% , standing extension close to 20 deg , LTR right full , left 75% , standing flexion 3\" fingertip to floor , finger to distal shin    Current : full pressup arms completely extended. Pt states feels good knee to chest with extra overpressure at end range gross WNL  4/22/2021  pain responds best to pressups then knees to chest then pressups again.      6.  Pt will be able to ambulate up and down stairs with 1 rail reciprocal step over step.   Eval: 1 step at time with 1 rail   Current:  In-progress, Patient demonstrated step over step 2 rails on 4\" step , very hesitant on 6\" step but did perform with 2 rails and stiffer gait.  Same but with cues smoother pattern  4/22/2021               PLAN  []  Upgrade activities as tolerated     [x]  Continue plan of care  []  Update interventions per flow sheet       []  Discharge due to:_  []  Other:_      Judah Gentile, PT, DPT 4/27/2021  2:39 PM    Future Appointments   Date Time Provider Yanira Raygoza   4/28/2021  8:00 AM VIVIENNE Martin [facility]   5/3/2021  2:30 PM [facility]   5/6/2021  2:30 PM VIVIENNE FryeHPTROZ [facility]   5/10/2021  1:45 PM VIVIENNE MartinHPTROZ [facility]   5/13/2021  1:45 PM VIVIENNE FryeTROZ [facility]   5/17/2021  2:30 PM VIVIENNE FryeHPTROZ [facility]   5/20/2021  1:45 PM Courtney Castro PT [facility]

## 2021-04-28 ENCOUNTER — APPOINTMENT (OUTPATIENT)
Dept: PHYSICAL THERAPY | Age: 66
End: 2021-04-28
Payer: MEDICARE

## 2021-05-03 ENCOUNTER — HOSPITAL ENCOUNTER (OUTPATIENT)
Dept: PHYSICAL THERAPY | Age: 66
Discharge: HOME | End: 2021-05-03
Payer: MEDICARE

## 2021-05-03 PROCEDURE — 97530 THERAPEUTIC ACTIVITIES: CPT | Performed by: PHYSICAL THERAPIST

## 2021-05-03 PROCEDURE — 97110 THERAPEUTIC EXERCISES: CPT | Performed by: PHYSICAL THERAPIST

## 2021-05-03 NOTE — PROGRESS NOTES
PT DAILY TREATMENT NOTE    Patient Name: José Miguel Christianson  NSBV:[id number]  : 1955  [x]  Patient  Verified  Payor: [place] MEDICARE / Plan: [place] MEDICARE PART A & B / Product Type: Medicare /    In time:1430 Out time:1520  Total Treatment Time (min): 50  Total Timed Codes (min): 50  1:1 Treatment Time ( [address] only): 45   Visit #: 19 of 24    Treatment Area: Low back pain [M54.5]  Sciatica, left side [M54.32]    SUBJECTIVE  Pain Level (0-10 scale): 2  Any medication changes, allergies to medications, adverse drug reactions, diagnosis change, or new procedure performed?: [x] No    [] Yes (see summary sheet for update)  Subjective functional status/changes:   [] No changes reported  Pt drove 3 hours in car over weekend both directions , back tolerated overall well just some soreness 3/10 when done , when stopped for break would stand and extend and walk which helped     Pt states pressups have been helping with her back pain , pt reports doing 3 x day pressups 10 reps       OBJECTIVE  35 min Therapeutic Exercise:  [x]? See flow sheet :   Rationale: increase ROM, increase strength and decrease pain to improve the patients ability to complete ADLs     15 min Therapeutic Activity:  [x]? See flow sheet :   Rationale: increase ROM, increase strength and improve coordination  to improve the patients ability to complete ADLs            With   [] TE   [] TA   [] neuro   [] other: Patient Education: [x] Review HEP    [] Progressed/Changed HEP based on:   [] positioning   [] body mechanics   [] transfers   [] heat/ice application    [] other:      Other Objective/Functional Measures: see ex grid for ex , ended with pressups      Pain Level (0-10 scale) post treatment: 2 \"not pain just soreness \"     ASSESSMENT/Changes in Function: no radicular sx reported , pt notes extension ex pressups helping , improved control with gait on stairs , heel dip off 4\" step and with sit to stand.  Fatigue after 10 reps sit to stand perform stretches then performed another 10 reps. Patient will continue to benefit from skilled PT services to modify and progress therapeutic interventions, address functional mobility deficits, address ROM deficits, address strength deficits, analyze and address soft tissue restrictions, analyze and cue movement patterns, analyze and modify body mechanics/ergonomics, assess and modify postural abnormalities and address imbalance/dizziness to attain remaining goals. []  See Plan of Care  [x]  See progress note/recertification  []  See Discharge Summary         Progress towards goals / Updated goals:  Long Term Goals: LTG- To be accomplished in 8 week(s):     1.  Pt will be independant with HEP for continued and ongoing progression following discharge toward full functional mobility. Eval:started on HEP  Current : In-progress, developing consistency with HEP, 4/27/2021     2. Pt pain will improve >= 80% to allow pt return to PLOF. Eval:10+ max pain  Current : pain ranges 0-4 sleeping well , average is usually at most a 1-2/10 progressing, same 4/22/2021      3. Pt FOTO score will increase by >=15 points to show improvement in functional mobility. Eval:47/100   Current: 52 Progressing 4/27/21      4.  Pt strength will improve to >=4+ in core and bilateral LE to allow pt to return to PLOF , improve walking tolerance and control   Eval:Hip flexion 3+/5 bilaterally , quad right 5/5 , left 3+/5, HS 4+/5 right , 4-/5 left DF ankle and toe extension 5/5   :3/22/2021HF   4-/5 bilat , quad right 5/5 ,left 4+/5 , DF bilaterally 5/5 , hamstring 5/5 bilaterally , ER 3+/5 right , 3/5 left progressing   Current MMT in sitting : HF right 4 to 4+/5 , left 4/5   Quad 5/5 , HS right 4/5 , left 4+/5   abd sitting 5/5 right , 4/5 left   Add 5/5 bilateral  ER 3+/5 right , 3 to 3+/5 left   ( ER hip same mild better left , less resist with HS today but stronger HF and quad ) progressing better with sit to stand functional strength with transfer better control holding 7# sit to stand from 20 \" table 20 reps .      5.  Pt ROM will improve to WNL trunk mobility  to allow pt to return to PLOF full functional mobility and increase ease with childcare.   Eval:pressups 50%, standing extension 7 deg , fingers to mid shin flexion , trunk rotation right close to 75% left 25%  PN 3/22/2021: progressing  pressup 75% , standing extension close to 20 deg , LTR right full , left 75% , standing flexion 3\" fingertip to floor , finger to distal shin    Current : full pressup arms completely extended. Pt states feels good knee to chest with extra overpressure at end range gross WNL  4/22/2021  pain responds best to pressups then knees to chest then pressups again.      6.  Pt will be able to ambulate up and down stairs with 1 rail reciprocal step over step.   Eval: 1 step at time with 1 rail   Current:  In-progress, Patient demonstrated step over step 2 rails on 4\" step , very hesitant on 6\" step but did perform with 2 rails and stiffer gait. noted smoother this session and less cues  5/3/2021                  PLAN  [x]  Upgrade activities as tolerated     [x]  Continue plan of care  []  Update interventions per flow sheet       []  Discharge due to:_  []  Other:_      Radha Copeland, PT 5/3/2021  2:40 PM    Future Appointments   Date Time Provider Yanira Raygoza   5/6/2021  2:30 PM VIVIENNE Zavala [facility]   5/10/2021  1:45 PM VIVIENNE Davis [facility]   5/13/2021  1:45 PM Patsy Zavala [facility]   5/17/2021  2:30 PM VIVIENNE Zavala [facility]   5/20/2021  1:45 PM Patsy Zavala [facility]

## 2021-05-06 ENCOUNTER — HOSPITAL ENCOUNTER (OUTPATIENT)
Dept: PHYSICAL THERAPY | Age: 66
Discharge: HOME | End: 2021-05-06
Payer: MEDICARE

## 2021-05-06 PROCEDURE — 97530 THERAPEUTIC ACTIVITIES: CPT | Performed by: PHYSICAL THERAPIST

## 2021-05-06 PROCEDURE — 97110 THERAPEUTIC EXERCISES: CPT | Performed by: PHYSICAL THERAPIST

## 2021-05-06 NOTE — PROGRESS NOTES
[facility], [address] [place] [address]  Phone: [phone]   Fax: [phone]    Discharge Summary    Patient name: Aamir Veolz     Start of Care:   Referral source: Scar BarajasDO    : 1955  Medical/Treatment Diagnosis: Low back pain [M54.5]  Sciatica, left side [M54.32]  Onset Date:2021  Prior Hospitalization: see medical history   Provider#: [id number]  Comorbidities: arthritis , high blood pressure , hx of TKR , hx of back neck shoulder pain with fx in right shoulder    Prior Level of Function: pt able to bend , lift and walk ,navigate stairs, drive and travel and take care of 4 and 6 y/o children without problems     Medications: Verified on Patient Summary List    Visits from Start of Care: 20    Missed Visits: 1  Reporting Period : 21 to 2021    Pt subjective report : no pain all day today 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     Pt feels she is 80% better with therapy at this time   Pt had some pain up to 4/10 yesterday but as soon as she did her extension ex it went away for 3-4 hours .      Pt reports I have mastered getting in and out of car both side smoothly   Walking up to 20 min at time then legs are a little sore but can rest and then go daily activity can do 20 min 2-3 x day . Long Term Goals: LTG- To be accomplished in 8 week(s):     1.  Pt will be independant with HEP for continued and ongoing progression following discharge toward full functional mobility. Eval:started on HEP  Current :  pt performing HEP consistently at home , minimal to no cues in dept , pt is able to continue on own with HEP , MET 2021      2. Pt pain will improve >= 80% to allow pt return to PLOF.    Eval:10+ max pain  Current : pain ranges 0-4 ( when back gets to 4/10 just has to do pressups or exetension ex and she is able to get it to calm down 0/10 )  sleeping well , average is usually at most a 0-1/10  pt reports she is 80% less pain overall since start of therapy, MET      3. Pt FOTO score will increase by >=15 points to show improvement in functional mobility. Eval:47/100   4/27/21: 52 Progressing    D/C: 5/6/2021 65/100 improved by 18 points MET     4. Pt strength will improve to >=4+ in core and bilateral LE to allow pt to return to PLOF , improve walking tolerance and control   Eval:Hip flexion 3+/5 bilaterally , quad right 5/5 , left 3+/5, HS 4+/5 right , 4-/5 left DF ankle and toe extension 5/5   :3/22/2021HF   4-/5 bilat , quad right 5/5 ,left 4+/5 , DF bilaterally 5/5 , hamstring 5/5 bilaterally , ER 3+/5 right , 3/5 left progressing   D/C: 5/6/2021   MMT in sitting : HF right 4+/5 , left 4 to 4+ /5   Quad 5/5 , HS 5/5 bilat    abd sitting 5/5 right , 4/5 left   Add 5/5 bilateral  ER 4+/5 right , 4/5 left    progressing better with sit to stand functional strength better control holding 11# sit to stand from 19.5 \" table 2x10 reps . close to meeting with all , progressing      5.  Pt ROM will improve to WNL trunk mobility  to allow pt to return to PLOF full functional mobility and increase ease with childcare.   Eval:pressups 50%, standing extension 7 deg , fingers to mid shin flexion , trunk rotation right close to 75% left 25%  PN 3/22/2021: progressing  pressup 75% , standing extension close to 20 deg , LTR right full , left 75% , standing flexion 3\" fingertip to floor , finger to distal shin    D/C  : full pressup arms completely extended. Pt states feels good knee to chest with extra overpressure at end range gross WNL , unilateral pressups close to =right at times stiff but releases with repeated mobility on left,   pain responds best to pressups then knees to chest then pressups again. MET 5/6/2021      6.  Pt will be able to ambulate up and down stairs with 1 rail reciprocal step over step.   Eval: 1 step at time with 1 rail   Current D/C :  In-progress, Patient demonstrated step over step 2 rails on 4\" step , sightly hesitant on 6\" step but did perform with 2 rails has been progressing smoother and is more confident.   5/6/202      Assessment/ Summary of Care: pt still gets some pain in LE and low back but able to lessen and abolish back pain with extension mobility ex , LE knee pain less intense and frequent and less with increase activity tolerance , improved ease and confidence with stairs, smooth gait and increase Trunk and LE ROM and strength.      RECOMMENDATIONS:  [x]Discontinue therapy: [x]Patient has reached or is progressing toward set goals      []Patient is non-compliant or has abdicated      []Due to lack of appreciable progress towards set Toya Galaviz, PT 5/6/2021 4:52 PM

## 2021-05-06 NOTE — PROGRESS NOTES
PT DAILY TREATMENT NOTE    Patient Name: Liat Rios  Date:2021  : 1955  [x]  Patient  Verified  Payor: [place] MEDICARE / Plan: [place] MEDICARE PART A & B / Product Type: Medicare /    In time:1430  Out time:1520  Total Treatment Time (min): 50  Total Timed Codes (min): 50  1:1 Treatment Time ([facility] only): 50   Visit #: 20 of 24    Treatment Area: Low back pain [M54.5]  Sciatica, left side [M54.32]    SUBJECTIVE  Pain Level (0-10 scale): 0     Any medication changes, allergies to medications, adverse drug reactions, diagnosis change, or new procedure performed?: [x] No    [] Yes (see summary sheet for update)  Subjective functional status/changes:   [] No changes reported  no pain all day today 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 Pt feels she is 80% better with therapy at this time     Pt had some pain up to 4/10 yesterday but as soon as she did her extension ex it went away for 3-4 hours . Pt reports I have mastered getting in and out of car both side smoothly   Walking up to 20 min at time then legs are a little sore but can rest and then go daily activity can do 20 min 2-3 x day . OBJECTIVE  35 min Therapeutic Exercise:  [x]? ? See flow sheet :   Rationale: increase ROM, increase strength and decrease pain to improve the patients ability to complete ADLs     15 min Therapeutic Activity:  [x]? ?  See flow sheet :   Rationale: increase ROM, increase strength and improve coordination  to improve the patients ability to complete ADLs            With   [] TE   [] TA   [] neuro   [] other: Patient Education: [x] Review HEP    [] Progressed/Changed HEP based on:   [] positioning   [] body mechanics   [] transfers   [] heat/ice application    [] other:      Other Objective/Functional Measures: FOTO : 65/100   MMT in sitting : HF right 4+/5 , left 4 to 4+ /5   Quad 5/5 , HS 5/5 bilat    abd sitting 5/5 right , 4/5 left   Add 5/5 bilateral  ER 4+/5 right , 4/5 left    progressing better with sit to stand functional strength better control holding 11# sit to stand from 19.5 \" table 2x10 reps . Pain Level (0-10 scale) post treatment: 0    ASSESSMENT/Changes in Function: pt has been progressing well and notes significant less back pain , increase walking tolerance , increase ease with sit to stand , increase resistance with exercises. Pt is able to continue with HEP with ex given and is ready for discharge today . []  See Plan of Care  [x]  See progress note/recertification  []  See Discharge Summary         Progress towards goals / Updated goals:  Long Term Goals: LTG- To be accomplished in 8 week(s):     1.  Pt will be independant with HEP for continued and ongoing progression following discharge toward full functional mobility. Eval:started on HEP  Current :  pt performing HEP consistently at home , minimal to no cues in dept , pt is able to continue on own with HEP , MET 5/6/2021      2. Pt pain will improve >= 80% to allow pt return to PLOF. Eval:10+ max pain  Current : pain ranges 0-4 ( when back gets to 4/10 just has to do pressups or exetension ex and she is able to get it to calm down 0/10 )  sleeping well , average is usually at most a 0-1/10  pt reports she is 80% less pain overall since start of therapy, MET      3. Pt FOTO score will increase by >=15 points to show improvement in functional mobility.    Eval:47/100   4/27/21: 52 Progressing    D/C: 5/6/2021 65/100 improved by 18 points MET     4. Pt strength will improve to >=4+ in core and bilateral LE to allow pt to return to PLOF , improve walking tolerance and control   Eval:Hip flexion 3+/5 bilaterally , quad right 5/5 , left 3+/5, HS 4+/5 right , 4-/5 left DF ankle and toe extension 5/5   :3/22/2021HF   4-/5 bilat , quad right 5/5 ,left 4+/5 , DF bilaterally 5/5 , hamstring 5/5 bilaterally , ER 3+/5 right , 3/5 left progressing   D/C: 5/6/2021   MMT in sitting : HF right 4+/5 , left 4 to 4+ /5 Quad 5/5 , HS 5/5 bilat    abd sitting 5/5 right , 4/5 left   Add 5/5 bilateral  ER 4+/5 right , 4/5 left    progressing better with sit to stand functional strength better control holding 11# sit to stand from 19.5 \" table 2x10 reps . close to meeting with all , progressing      5.  Pt ROM will improve to WNL trunk mobility  to allow pt to return to PLOF full functional mobility and increase ease with childcare.   Eval:pressups 50%, standing extension 7 deg , fingers to mid shin flexion , trunk rotation right close to 75% left 25%  PN 3/22/2021: progressing  pressup 75% , standing extension close to 20 deg , LTR right full , left 75% , standing flexion 3\" fingertip to floor , finger to distal shin    D/C  : full pressup arms completely extended. Pt states feels good knee to chest with extra overpressure at end range gross WNL , unilateral pressups close to =right at times stiff but releases with repeated mobility on left,   pain responds best to pressups then knees to chest then pressups again. MET 5/6/2021      6.  Pt will be able to ambulate up and down stairs with 1 rail reciprocal step over step.   Eval: 1 step at time with 1 rail   Current D/C :  In-progress, Patient demonstrated step over step 2 rails on 4\" step , sightly hesitant on 6\" step but did perform with 2 rails has been progressing smoother and is more confident.   5/6/202    PLAN  [x]  Upgrade activities as tolerated     [x]  Continue plan of care  []  Update interventions per flow sheet       []  Discharge due to:_  []  Other:_      Negin Connor, PT 5/6/2021  2:34 PM    Future Appointments   Date Time Provider Yanira Raygoza   5/10/2021  1:45 PM Jack Sanchez, VIVIENNE ANDREW [facility]   5/13/2021  1:45 PM Patsy Frye [facility]   5/17/2021  2:30 PM VIVIENNE Frye [facility]   5/20/2021  1:45 PM Patsy Frye [facility]

## 2021-05-10 ENCOUNTER — APPOINTMENT (OUTPATIENT)
Dept: PHYSICAL THERAPY | Age: 66
End: 2021-05-10
Payer: MEDICARE

## 2021-05-13 ENCOUNTER — APPOINTMENT (OUTPATIENT)
Dept: PHYSICAL THERAPY | Age: 66
End: 2021-05-13
Payer: MEDICARE

## 2021-05-17 ENCOUNTER — APPOINTMENT (OUTPATIENT)
Dept: PHYSICAL THERAPY | Age: 66
End: 2021-05-17
Payer: MEDICARE

## 2021-05-20 ENCOUNTER — APPOINTMENT (OUTPATIENT)
Dept: PHYSICAL THERAPY | Age: 66
End: 2021-05-20
Payer: MEDICARE
